# Patient Record
Sex: MALE | Race: AMERICAN INDIAN OR ALASKA NATIVE | Employment: FULL TIME | URBAN - METROPOLITAN AREA
[De-identification: names, ages, dates, MRNs, and addresses within clinical notes are randomized per-mention and may not be internally consistent; named-entity substitution may affect disease eponyms.]

---

## 2017-02-23 ENCOUNTER — OFFICE VISIT (OUTPATIENT)
Dept: INTERNAL MEDICINE CLINIC | Facility: CLINIC | Age: 57
End: 2017-02-23

## 2017-02-23 VITALS
BODY MASS INDEX: 26.5 KG/M2 | OXYGEN SATURATION: 97 % | HEIGHT: 62 IN | DIASTOLIC BLOOD PRESSURE: 90 MMHG | HEART RATE: 79 BPM | WEIGHT: 144 LBS | SYSTOLIC BLOOD PRESSURE: 128 MMHG | TEMPERATURE: 98 F

## 2017-02-23 DIAGNOSIS — J11.1 FLU SYNDROME: Primary | ICD-10-CM

## 2017-02-23 LAB
FLUAV + FLUBV RNA SPEC NAA+PROBE: NEGATIVE
FLUAV + FLUBV RNA SPEC NAA+PROBE: NEGATIVE
FLUAV + FLUBV RNA SPEC NAA+PROBE: POSITIVE

## 2017-02-23 PROCEDURE — 99213 OFFICE O/P EST LOW 20 MIN: CPT | Performed by: FAMILY MEDICINE

## 2017-02-23 PROCEDURE — 87631 RESP VIRUS 3-5 TARGETS: CPT | Performed by: FAMILY MEDICINE

## 2017-02-23 RX ORDER — OSELTAMIVIR PHOSPHATE 75 MG/1
75 CAPSULE ORAL 2 TIMES DAILY
Qty: 10 CAPSULE | Refills: 0 | Status: SHIPPED | OUTPATIENT
Start: 2017-02-23 | End: 2017-07-27 | Stop reason: ALTCHOICE

## 2017-02-23 RX ORDER — PROMETHAZINE HYDROCHLORIDE AND CODEINE PHOSPHATE 6.25; 1 MG/5ML; MG/5ML
5 SYRUP ORAL EVERY 6 HOURS PRN
Qty: 240 ML | Refills: 0 | Status: SHIPPED | OUTPATIENT
Start: 2017-02-23 | End: 2017-07-27 | Stop reason: ALTCHOICE

## 2017-02-24 NOTE — PROGRESS NOTES
Patient presents with:  Sore Throat: Pt presents to clinic for c/o sore throat and headaches since yesterday. Pt also had fever. HPI:   Андрей Garcia is a 64year old male who presents for upper respiratory symptoms for  2  days.  Patient reports low syndrome    - Respiratory Panel FLU A + B, RSV [E]; Future  - Oseltamivir Phosphate (TAMIFLU) 75 MG Oral Cap; Take 1 capsule (75 mg total) by mouth 2 (two) times daily. Dispense: 10 capsule; Refill: 0  - promethazine-codeine 6.25-10 MG/5ML Oral Syrup;  Raenette Bitters

## 2017-07-27 ENCOUNTER — OFFICE VISIT (OUTPATIENT)
Dept: INTERNAL MEDICINE CLINIC | Facility: CLINIC | Age: 57
End: 2017-07-27

## 2017-07-27 VITALS
HEART RATE: 67 BPM | WEIGHT: 144 LBS | DIASTOLIC BLOOD PRESSURE: 78 MMHG | SYSTOLIC BLOOD PRESSURE: 118 MMHG | OXYGEN SATURATION: 97 % | BODY MASS INDEX: 26.5 KG/M2 | RESPIRATION RATE: 17 BRPM | HEIGHT: 62 IN

## 2017-07-27 DIAGNOSIS — E78.1 HYPERTRIGLYCERIDEMIA: ICD-10-CM

## 2017-07-27 DIAGNOSIS — Z12.5 SCREENING FOR PROSTATE CANCER: ICD-10-CM

## 2017-07-27 DIAGNOSIS — Z00.00 ANNUAL PHYSICAL EXAM: Primary | ICD-10-CM

## 2017-07-27 DIAGNOSIS — E88.81 INSULIN RESISTANCE: ICD-10-CM

## 2017-07-27 LAB
CHOLEST SERPL-MCNC: 178 MG/DL (ref 110–200)
GLUCOSE SERPL-MCNC: 134 MG/DL (ref 70–99)
HDLC SERPL-MCNC: 37 MG/DL
LDLC SERPL CALC-MCNC: 83 MG/DL (ref 0–99)
NONHDLC SERPL-MCNC: 141 MG/DL
PSA SERPL-MCNC: 0.6 NG/ML (ref 0–4)
TRIGL SERPL-MCNC: 289 MG/DL (ref 1–149)

## 2017-07-27 PROCEDURE — 82947 ASSAY GLUCOSE BLOOD QUANT: CPT | Performed by: FAMILY MEDICINE

## 2017-07-27 PROCEDURE — 36415 COLL VENOUS BLD VENIPUNCTURE: CPT | Performed by: FAMILY MEDICINE

## 2017-07-27 PROCEDURE — 99396 PREV VISIT EST AGE 40-64: CPT | Performed by: FAMILY MEDICINE

## 2017-07-27 PROCEDURE — 80061 LIPID PANEL: CPT | Performed by: FAMILY MEDICINE

## 2017-07-27 PROCEDURE — 84153 ASSAY OF PSA TOTAL: CPT | Performed by: FAMILY MEDICINE

## 2017-07-27 NOTE — PROGRESS NOTES
Allan Murdock is a 62year old male who presents for a complete physical exam.   HPI:     Concerns:  STOPPED METFORMIN ER & HAS ELIMINATED SODA'S/JUICE TO DECREASE GLUCOSE.  TAKES ONE FISH OIL CAP DAILY    Last colonoscopy:  2011  [address] headaches  PSYCHE: denies depression or anxiety    EXAM:   /78 (BP Location: Right arm, Patient Position: Sitting, Cuff Size: adult)   Pulse 67   Resp 17   Ht 62\"   Wt 144 lb   SpO2 97%   BMI 26.34 kg/m²   Body mass index is 26.34 kg/m².    GENERAL:

## 2017-11-10 DIAGNOSIS — M1A.0620 IDIOPATHIC CHRONIC GOUT OF LEFT KNEE WITHOUT TOPHUS: Primary | ICD-10-CM

## 2017-11-10 RX ORDER — INDOMETHACIN 50 MG/1
CAPSULE ORAL
Qty: 50 CAPSULE | Refills: 0 | Status: SHIPPED | OUTPATIENT
Start: 2017-11-10 | End: 2019-01-14

## 2018-09-11 ENCOUNTER — OFFICE VISIT (OUTPATIENT)
Dept: INTERNAL MEDICINE CLINIC | Facility: CLINIC | Age: 58
End: 2018-09-11
Payer: COMMERCIAL

## 2018-09-11 VITALS
BODY MASS INDEX: 26.13 KG/M2 | OXYGEN SATURATION: 97 % | HEART RATE: 62 BPM | HEIGHT: 62 IN | WEIGHT: 142 LBS | DIASTOLIC BLOOD PRESSURE: 70 MMHG | SYSTOLIC BLOOD PRESSURE: 128 MMHG

## 2018-09-11 DIAGNOSIS — Z00.00 ANNUAL PHYSICAL EXAM: Primary | ICD-10-CM

## 2018-09-11 DIAGNOSIS — Z12.5 SCREENING FOR PROSTATE CANCER: ICD-10-CM

## 2018-09-11 DIAGNOSIS — Z87.39 PERSONAL HISTORY OF GOUT: ICD-10-CM

## 2018-09-11 DIAGNOSIS — E88.81 INSULIN RESISTANCE: ICD-10-CM

## 2018-09-11 DIAGNOSIS — E78.1 HYPERTRIGLYCERIDEMIA: ICD-10-CM

## 2018-09-11 LAB
ALBUMIN SERPL BCP-MCNC: 4.2 G/DL (ref 3.5–4.8)
ALBUMIN/GLOB SERPL: 1.5 {RATIO} (ref 1–2)
ALP SERPL-CCNC: 61 U/L (ref 32–100)
ALT SERPL-CCNC: 38 U/L (ref 17–63)
ANION GAP SERPL CALC-SCNC: 9 MMOL/L (ref 0–18)
AST SERPL-CCNC: 27 U/L (ref 15–41)
BILIRUB SERPL-MCNC: 1.1 MG/DL (ref 0.3–1.2)
BUN SERPL-MCNC: 12 MG/DL (ref 8–20)
BUN/CREAT SERPL: 15.4 (ref 10–20)
CALCIUM SERPL-MCNC: 9.1 MG/DL (ref 8.5–10.5)
CHLORIDE SERPL-SCNC: 105 MMOL/L (ref 95–110)
CHOLEST SERPL-MCNC: 185 MG/DL (ref 110–200)
CO2 SERPL-SCNC: 23 MMOL/L (ref 22–32)
CREAT SERPL-MCNC: 0.78 MG/DL (ref 0.5–1.5)
GLOBULIN PLAS-MCNC: 2.8 G/DL (ref 2.5–3.7)
GLUCOSE SERPL-MCNC: 144 MG/DL (ref 70–99)
HDLC SERPL-MCNC: 40 MG/DL
LDLC SERPL CALC-MCNC: 92 MG/DL (ref 0–99)
NONHDLC SERPL-MCNC: 145 MG/DL
OSMOLALITY UR CALC.SUM OF ELEC: 286 MOSM/KG (ref 275–295)
PATIENT FASTING: YES
POTASSIUM SERPL-SCNC: 3.7 MMOL/L (ref 3.3–5.1)
PROT SERPL-MCNC: 7 G/DL (ref 5.9–8.4)
PSA SERPL-MCNC: 0.8 NG/ML (ref 0–4)
SODIUM SERPL-SCNC: 137 MMOL/L (ref 136–144)
TRIGL SERPL-MCNC: 264 MG/DL (ref 1–149)

## 2018-09-11 PROCEDURE — 84153 ASSAY OF PSA TOTAL: CPT | Performed by: FAMILY MEDICINE

## 2018-09-11 PROCEDURE — 83036 HEMOGLOBIN GLYCOSYLATED A1C: CPT | Performed by: FAMILY MEDICINE

## 2018-09-11 PROCEDURE — 80053 COMPREHEN METABOLIC PANEL: CPT | Performed by: FAMILY MEDICINE

## 2018-09-11 PROCEDURE — 99396 PREV VISIT EST AGE 40-64: CPT | Performed by: FAMILY MEDICINE

## 2018-09-11 PROCEDURE — 80061 LIPID PANEL: CPT | Performed by: FAMILY MEDICINE

## 2018-09-11 RX ORDER — LOSARTAN POTASSIUM 50 MG/1
50 TABLET ORAL DAILY
Qty: 90 TABLET | Refills: 3 | Status: SHIPPED | OUTPATIENT
Start: 2018-09-11 | End: 2019-02-12

## 2018-09-11 RX ORDER — METFORMIN HYDROCHLORIDE 500 MG/1
500 TABLET, EXTENDED RELEASE ORAL DAILY
Qty: 90 TABLET | Refills: 3 | Status: SHIPPED | OUTPATIENT
Start: 2018-09-11 | End: 2019-02-12

## 2018-09-11 RX ORDER — INDOMETHACIN 50 MG/1
50 CAPSULE ORAL 2 TIMES DAILY PRN
Qty: 30 CAPSULE | Refills: 0 | Status: SHIPPED | OUTPATIENT
Start: 2018-09-11 | End: 2020-04-18 | Stop reason: ALTCHOICE

## 2018-09-11 NOTE — PROGRESS NOTES
Serena Gutierrez is a 62year old male who presents for a complete physical exam/FASTING FOR LABS. HPI:     Concerns:  CURRENTLY NOT TAKING ANY MEDS. GETS GOUT FLARE UP ON KNEES A COUPLE TIMES A YEAR WHICH RESPONDS TO INDOCIN.     Last colonoscopy:  8 YEARS denies chest pain on exertion  GI: denies abdominal pain, constipation or diarrhea  : denies nocturia or changes in stream  NEURO: denies headaches  PSYCHE: denies depression or anxiety  MSKL:  EPISODIC KNEE PAINS/GOUT A COUPLE TIMES A YEAR AS PER CC

## 2018-09-12 LAB — HBA1C MFR BLD: 7.3 % (ref 4–6)

## 2018-12-21 DIAGNOSIS — E11.9 TYPE 2 DIABETES MELLITUS WITHOUT COMPLICATION, WITHOUT LONG-TERM CURRENT USE OF INSULIN (HCC): Primary | ICD-10-CM

## 2018-12-21 RX ORDER — ATORVASTATIN CALCIUM 10 MG/1
10 TABLET, FILM COATED ORAL NIGHTLY
Qty: 90 TABLET | Refills: 3 | Status: SHIPPED | OUTPATIENT
Start: 2018-12-21 | End: 2019-02-12

## 2019-01-14 DIAGNOSIS — M1A.0620 IDIOPATHIC CHRONIC GOUT OF LEFT KNEE WITHOUT TOPHUS: ICD-10-CM

## 2019-01-14 RX ORDER — INDOMETHACIN 50 MG/1
CAPSULE ORAL
Qty: 50 CAPSULE | Refills: 0 | Status: SHIPPED | OUTPATIENT
Start: 2019-01-14 | End: 2019-09-04

## 2019-02-12 DIAGNOSIS — E11.9 TYPE 2 DIABETES MELLITUS WITHOUT COMPLICATION, WITHOUT LONG-TERM CURRENT USE OF INSULIN (HCC): ICD-10-CM

## 2019-02-12 RX ORDER — METFORMIN HYDROCHLORIDE 500 MG/1
500 TABLET, EXTENDED RELEASE ORAL DAILY
Qty: 90 TABLET | Refills: 1 | Status: SHIPPED | OUTPATIENT
Start: 2019-02-12 | End: 2019-04-12 | Stop reason: DRUGHIGH

## 2019-02-12 RX ORDER — LOSARTAN POTASSIUM 50 MG/1
50 TABLET ORAL DAILY
Qty: 90 TABLET | Refills: 1 | Status: SHIPPED | OUTPATIENT
Start: 2019-02-12 | End: 2019-04-12

## 2019-02-12 RX ORDER — ATORVASTATIN CALCIUM 10 MG/1
10 TABLET, FILM COATED ORAL NIGHTLY
Qty: 90 TABLET | Refills: 1 | Status: SHIPPED | OUTPATIENT
Start: 2019-02-12 | End: 2019-04-12

## 2019-04-12 ENCOUNTER — OFFICE VISIT (OUTPATIENT)
Dept: INTERNAL MEDICINE CLINIC | Facility: CLINIC | Age: 59
End: 2019-04-12
Payer: COMMERCIAL

## 2019-04-12 VITALS
DIASTOLIC BLOOD PRESSURE: 72 MMHG | SYSTOLIC BLOOD PRESSURE: 132 MMHG | BODY MASS INDEX: 26.39 KG/M2 | TEMPERATURE: 99 F | OXYGEN SATURATION: 98 % | HEART RATE: 68 BPM | RESPIRATION RATE: 17 BRPM | HEIGHT: 62 IN | WEIGHT: 143.38 LBS

## 2019-04-12 DIAGNOSIS — E11.9 TYPE 2 DIABETES MELLITUS WITHOUT COMPLICATION, WITHOUT LONG-TERM CURRENT USE OF INSULIN (HCC): Primary | ICD-10-CM

## 2019-04-12 DIAGNOSIS — E78.1 HYPERTRIGLYCERIDEMIA: ICD-10-CM

## 2019-04-12 PROCEDURE — 80061 LIPID PANEL: CPT | Performed by: FAMILY MEDICINE

## 2019-04-12 PROCEDURE — 83036 HEMOGLOBIN GLYCOSYLATED A1C: CPT | Performed by: FAMILY MEDICINE

## 2019-04-12 PROCEDURE — 99213 OFFICE O/P EST LOW 20 MIN: CPT | Performed by: FAMILY MEDICINE

## 2019-04-12 PROCEDURE — 80053 COMPREHEN METABOLIC PANEL: CPT | Performed by: FAMILY MEDICINE

## 2019-04-12 RX ORDER — ATORVASTATIN CALCIUM 10 MG/1
10 TABLET, FILM COATED ORAL NIGHTLY
Qty: 90 TABLET | Refills: 1 | Status: SHIPPED | OUTPATIENT
Start: 2019-04-12 | End: 2020-04-18

## 2019-04-12 RX ORDER — LOSARTAN POTASSIUM 50 MG/1
50 TABLET ORAL DAILY
Qty: 90 TABLET | Refills: 1 | Status: SHIPPED | OUTPATIENT
Start: 2019-04-12 | End: 2020-04-18

## 2019-04-12 RX ORDER — METFORMIN HYDROCHLORIDE 750 MG/1
750 TABLET, EXTENDED RELEASE ORAL DAILY
Qty: 90 TABLET | Refills: 1 | Status: SHIPPED | OUTPATIENT
Start: 2019-04-12 | End: 2019-06-07

## 2019-04-12 NOTE — PROGRESS NOTES
CC:  Diabetes (patient f/u on DM. fasting today )      Hx of CC:  FASTING FOR RECHECK OF DM AND TRIGLYCERIDES. ON METFORMIN  MG AND TAKING FISH OIL CAPS. DOES NOT CHECK GLUCOSE AT HOME. NO COMPLAINTS.     Vitals:    04/12/19  0915   BP: 132/72   Pu

## 2019-06-07 DIAGNOSIS — E11.9 TYPE 2 DIABETES MELLITUS WITHOUT COMPLICATION, WITHOUT LONG-TERM CURRENT USE OF INSULIN (HCC): ICD-10-CM

## 2019-06-07 RX ORDER — METFORMIN HYDROCHLORIDE 750 MG/1
750 TABLET, EXTENDED RELEASE ORAL 2 TIMES DAILY WITH MEALS
Qty: 180 TABLET | Refills: 1 | Status: SHIPPED | OUTPATIENT
Start: 2019-06-07 | End: 2020-04-18

## 2019-09-04 DIAGNOSIS — M1A.0620 IDIOPATHIC CHRONIC GOUT OF LEFT KNEE WITHOUT TOPHUS: ICD-10-CM

## 2019-09-04 NOTE — TELEPHONE ENCOUNTER
Requested Prescriptions     Pending Prescriptions Disp Refills   • INDOMETHACIN 50 MG Oral Cap 50 capsule 0     Sig: TAKE ONE CAPSULE BY MOUTH TWICE DAILY AS NEEDED FOR PAIN     Last office visit: 4-12-19  Medication last refilled: 1-14-19 # 50 x 0

## 2019-09-05 RX ORDER — INDOMETHACIN 50 MG/1
CAPSULE ORAL
Qty: 50 CAPSULE | Refills: 0 | Status: SHIPPED | OUTPATIENT
Start: 2019-09-05 | End: 2020-04-18 | Stop reason: ALTCHOICE

## 2020-04-18 DIAGNOSIS — E11.9 TYPE 2 DIABETES MELLITUS WITHOUT COMPLICATION, WITHOUT LONG-TERM CURRENT USE OF INSULIN (HCC): ICD-10-CM

## 2020-04-18 RX ORDER — ATORVASTATIN CALCIUM 10 MG/1
10 TABLET, FILM COATED ORAL NIGHTLY
Qty: 90 TABLET | Refills: 1 | Status: SHIPPED | OUTPATIENT
Start: 2020-04-18 | End: 2020-08-28

## 2020-04-18 RX ORDER — METFORMIN HYDROCHLORIDE 750 MG/1
750 TABLET, EXTENDED RELEASE ORAL 2 TIMES DAILY WITH MEALS
Qty: 180 TABLET | Refills: 1 | Status: SHIPPED | OUTPATIENT
Start: 2020-04-18 | End: 2020-08-28 | Stop reason: DRUGHIGH

## 2020-04-18 RX ORDER — LOSARTAN POTASSIUM 50 MG/1
50 TABLET ORAL DAILY
Qty: 90 TABLET | Refills: 1 | Status: SHIPPED | OUTPATIENT
Start: 2020-04-18 | End: 2020-08-28 | Stop reason: DRUGHIGH

## 2020-08-28 ENCOUNTER — OFFICE VISIT (OUTPATIENT)
Dept: INTERNAL MEDICINE CLINIC | Facility: CLINIC | Age: 60
End: 2020-08-28
Payer: COMMERCIAL

## 2020-08-28 VITALS
HEART RATE: 71 BPM | RESPIRATION RATE: 17 BRPM | DIASTOLIC BLOOD PRESSURE: 90 MMHG | WEIGHT: 137 LBS | HEIGHT: 62 IN | BODY MASS INDEX: 25.21 KG/M2 | OXYGEN SATURATION: 98 % | SYSTOLIC BLOOD PRESSURE: 154 MMHG

## 2020-08-28 DIAGNOSIS — Z23 NEED FOR VACCINATION FOR STREP PNEUMONIAE: ICD-10-CM

## 2020-08-28 DIAGNOSIS — Z87.39 PERSONAL HISTORY OF GOUT: ICD-10-CM

## 2020-08-28 DIAGNOSIS — Z00.00 ANNUAL PHYSICAL EXAM: Primary | ICD-10-CM

## 2020-08-28 DIAGNOSIS — I10 ESSENTIAL HYPERTENSION: ICD-10-CM

## 2020-08-28 DIAGNOSIS — E11.9 TYPE 2 DIABETES MELLITUS WITHOUT COMPLICATION, WITHOUT LONG-TERM CURRENT USE OF INSULIN (HCC): ICD-10-CM

## 2020-08-28 DIAGNOSIS — E78.1 HYPERTRIGLYCERIDEMIA: ICD-10-CM

## 2020-08-28 LAB
ALBUMIN SERPL-MCNC: 3.7 G/DL (ref 3.4–5)
ALBUMIN/GLOB SERPL: 1 {RATIO} (ref 1–2)
ALP LIVER SERPL-CCNC: 71 U/L (ref 45–117)
ALT SERPL-CCNC: 31 U/L (ref 16–61)
ANION GAP SERPL CALC-SCNC: 9 MMOL/L (ref 0–18)
AST SERPL-CCNC: 11 U/L (ref 15–37)
BILIRUB SERPL-MCNC: 0.8 MG/DL (ref 0.1–2)
BUN BLD-MCNC: 17 MG/DL (ref 7–18)
BUN/CREAT SERPL: 16.2 (ref 10–20)
CALCIUM BLD-MCNC: 9.1 MG/DL (ref 8.5–10.1)
CHLORIDE SERPL-SCNC: 108 MMOL/L (ref 98–112)
CHOLEST SMN-MCNC: 176 MG/DL (ref ?–200)
CO2 SERPL-SCNC: 24 MMOL/L (ref 21–32)
CREAT BLD-MCNC: 1.05 MG/DL (ref 0.7–1.3)
EST. AVERAGE GLUCOSE BLD GHB EST-MCNC: 160 MG/DL (ref 68–126)
GLOBULIN PLAS-MCNC: 3.6 G/DL (ref 2.8–4.4)
GLUCOSE BLD-MCNC: 129 MG/DL (ref 70–99)
HBA1C MFR BLD HPLC: 7.2 % (ref ?–5.7)
HDLC SERPL-MCNC: 44 MG/DL (ref 40–59)
LDLC SERPL CALC-MCNC: 95 MG/DL (ref ?–100)
M PROTEIN MFR SERPL ELPH: 7.3 G/DL (ref 6.4–8.2)
NONHDLC SERPL-MCNC: 132 MG/DL (ref ?–130)
OSMOLALITY SERPL CALC.SUM OF ELEC: 295 MOSM/KG (ref 275–295)
PATIENT FASTING Y/N/NP: YES
PATIENT FASTING Y/N/NP: YES
POTASSIUM SERPL-SCNC: 3.4 MMOL/L (ref 3.5–5.1)
SODIUM SERPL-SCNC: 141 MMOL/L (ref 136–145)
TRIGL SERPL-MCNC: 185 MG/DL (ref 30–149)
URATE SERPL-MCNC: 6 MG/DL (ref 3.5–7.2)
VLDLC SERPL CALC-MCNC: 37 MG/DL (ref 0–30)

## 2020-08-28 PROCEDURE — 80053 COMPREHEN METABOLIC PANEL: CPT | Performed by: FAMILY MEDICINE

## 2020-08-28 PROCEDURE — 80061 LIPID PANEL: CPT | Performed by: FAMILY MEDICINE

## 2020-08-28 PROCEDURE — 90471 IMMUNIZATION ADMIN: CPT | Performed by: FAMILY MEDICINE

## 2020-08-28 PROCEDURE — 3077F SYST BP >= 140 MM HG: CPT | Performed by: FAMILY MEDICINE

## 2020-08-28 PROCEDURE — 3080F DIAST BP >= 90 MM HG: CPT | Performed by: FAMILY MEDICINE

## 2020-08-28 PROCEDURE — 3008F BODY MASS INDEX DOCD: CPT | Performed by: FAMILY MEDICINE

## 2020-08-28 PROCEDURE — 84550 ASSAY OF BLOOD/URIC ACID: CPT | Performed by: FAMILY MEDICINE

## 2020-08-28 PROCEDURE — 99396 PREV VISIT EST AGE 40-64: CPT | Performed by: FAMILY MEDICINE

## 2020-08-28 PROCEDURE — 83036 HEMOGLOBIN GLYCOSYLATED A1C: CPT | Performed by: FAMILY MEDICINE

## 2020-08-28 PROCEDURE — 90732 PPSV23 VACC 2 YRS+ SUBQ/IM: CPT | Performed by: FAMILY MEDICINE

## 2020-08-28 RX ORDER — ATORVASTATIN CALCIUM 10 MG/1
10 TABLET, FILM COATED ORAL NIGHTLY
Qty: 90 TABLET | Refills: 3 | Status: SHIPPED | OUTPATIENT
Start: 2020-08-28 | End: 2021-07-27

## 2020-08-28 RX ORDER — LOSARTAN POTASSIUM 100 MG/1
100 TABLET ORAL DAILY
Qty: 90 TABLET | Refills: 3 | Status: SHIPPED | OUTPATIENT
Start: 2020-08-28 | End: 2021-07-27

## 2020-08-28 NOTE — PROGRESS NOTES
Barby Mcgrath is a 26[address] [facility] who presents for a complete physical exam.   HPI:     Concerns:  Glucose levels in the 150-170 F range on metformin er 750 mg    Last colonoscopy:  2011  Last PSA: 2019    Wt Readings from Last 6 Encounters:  08/28/20 : denies abdominal pain, constipation or diarrhea  : denies nocturia or changes in stream  NEURO: denies headaches  PSYCHE: denies depression or anxiety  MSKL:  HAD RECENT LEFT FOOT PAIN/SWELLING--? RECURRENT GOUT FLARE    EXAM:   /90   Pulse 71   Re Refills for this Visit:  Requested Prescriptions     Signed Prescriptions Disp Refills   • SITagliptin-metFORMIN HCl (JANUMET)  MG Oral Tab 180 tablet 3     Sig: Take 1 tablet by mouth 2 (two) times daily with meals.    • losartan 100 MG Oral Tab 90

## 2020-10-14 ENCOUNTER — TELEPHONE (OUTPATIENT)
Dept: INTERNAL MEDICINE CLINIC | Facility: CLINIC | Age: 60
End: 2020-10-14

## 2020-10-14 NOTE — TELEPHONE ENCOUNTER
Unable to leave message for patient, second call to scheduled him with Dr. Tasha Davis on  10/19/2020.

## 2020-10-23 RX ORDER — INDOMETHACIN 50 MG/1
50 CAPSULE ORAL 2 TIMES DAILY PRN
Qty: 60 CAPSULE | Refills: 0 | Status: SHIPPED | OUTPATIENT
Start: 2020-10-23 | End: 2021-07-27

## 2020-10-29 RX ORDER — INDOMETHACIN 50 MG/1
CAPSULE ORAL
Qty: 60 CAPSULE | Refills: 11 | OUTPATIENT
Start: 2020-10-29

## 2020-12-18 ENCOUNTER — IMMUNIZATION (OUTPATIENT)
Dept: INTERNAL MEDICINE CLINIC | Facility: CLINIC | Age: 60
End: 2020-12-18
Payer: COMMERCIAL

## 2020-12-18 DIAGNOSIS — Z23 NEED FOR VACCINATION: ICD-10-CM

## 2020-12-18 PROCEDURE — 90471 IMMUNIZATION ADMIN: CPT | Performed by: FAMILY MEDICINE

## 2020-12-18 PROCEDURE — 90686 IIV4 VACC NO PRSV 0.5 ML IM: CPT | Performed by: FAMILY MEDICINE

## 2021-07-27 ENCOUNTER — OFFICE VISIT (OUTPATIENT)
Dept: INTERNAL MEDICINE CLINIC | Facility: CLINIC | Age: 61
End: 2021-07-27
Payer: COMMERCIAL

## 2021-07-27 VITALS
WEIGHT: 138 LBS | RESPIRATION RATE: 16 BRPM | BODY MASS INDEX: 25.4 KG/M2 | OXYGEN SATURATION: 97 % | HEART RATE: 75 BPM | DIASTOLIC BLOOD PRESSURE: 84 MMHG | HEIGHT: 62 IN | SYSTOLIC BLOOD PRESSURE: 120 MMHG

## 2021-07-27 DIAGNOSIS — M54.50 ACUTE RIGHT-SIDED LOW BACK PAIN WITHOUT SCIATICA: ICD-10-CM

## 2021-07-27 DIAGNOSIS — E78.1 HYPERTRIGLYCERIDEMIA: ICD-10-CM

## 2021-07-27 DIAGNOSIS — Z12.11 COLON CANCER SCREENING: ICD-10-CM

## 2021-07-27 DIAGNOSIS — E11.9 TYPE 2 DIABETES MELLITUS WITHOUT COMPLICATION, WITHOUT LONG-TERM CURRENT USE OF INSULIN (HCC): Primary | ICD-10-CM

## 2021-07-27 DIAGNOSIS — Z12.5 PROSTATE CANCER SCREENING: ICD-10-CM

## 2021-07-27 DIAGNOSIS — I10 ESSENTIAL HYPERTENSION: ICD-10-CM

## 2021-07-27 LAB
ALBUMIN SERPL-MCNC: 3.7 G/DL (ref 3.4–5)
ALBUMIN/GLOB SERPL: 1.1 {RATIO} (ref 1–2)
ALP LIVER SERPL-CCNC: 62 U/L
ALT SERPL-CCNC: 38 U/L
ANION GAP SERPL CALC-SCNC: 8 MMOL/L (ref 0–18)
AST SERPL-CCNC: 15 U/L (ref 15–37)
BILIRUB SERPL-MCNC: 1 MG/DL (ref 0.1–2)
BUN BLD-MCNC: 18 MG/DL (ref 7–18)
BUN/CREAT SERPL: 20.9 (ref 10–20)
CALCIUM BLD-MCNC: 8.9 MG/DL (ref 8.5–10.1)
CARTRIDGE LOT#: 814 NUMERIC
CHLORIDE SERPL-SCNC: 108 MMOL/L (ref 98–112)
CHOLEST SMN-MCNC: 165 MG/DL (ref ?–200)
CO2 SERPL-SCNC: 23 MMOL/L (ref 21–32)
COMPLEXED PSA SERPL-MCNC: 1.04 NG/ML (ref ?–4)
CREAT BLD-MCNC: 0.86 MG/DL
CREAT UR-SCNC: 170 MG/DL
GLOBULIN PLAS-MCNC: 3.4 G/DL (ref 2.8–4.4)
GLUCOSE BLD-MCNC: 135 MG/DL (ref 70–99)
HDLC SERPL-MCNC: 35 MG/DL (ref 40–59)
HEMOGLOBIN A1C: 6.3 % (ref 4.3–5.6)
LDLC SERPL CALC-MCNC: 84 MG/DL (ref ?–100)
M PROTEIN MFR SERPL ELPH: 7.1 G/DL (ref 6.4–8.2)
MICROALBUMIN UR-MCNC: 1.43 MG/DL
MICROALBUMIN/CREAT 24H UR-RTO: 8.4 UG/MG (ref ?–30)
NONHDLC SERPL-MCNC: 130 MG/DL (ref ?–130)
OSMOLALITY SERPL CALC.SUM OF ELEC: 292 MOSM/KG (ref 275–295)
PATIENT FASTING Y/N/NP: YES
PATIENT FASTING Y/N/NP: YES
POTASSIUM SERPL-SCNC: 3.9 MMOL/L (ref 3.5–5.1)
SODIUM SERPL-SCNC: 139 MMOL/L (ref 136–145)
TRIGL SERPL-MCNC: 281 MG/DL (ref 30–149)
VLDLC SERPL CALC-MCNC: 45 MG/DL (ref 0–30)

## 2021-07-27 PROCEDURE — 82043 UR ALBUMIN QUANTITATIVE: CPT | Performed by: FAMILY MEDICINE

## 2021-07-27 PROCEDURE — 3074F SYST BP LT 130 MM HG: CPT | Performed by: FAMILY MEDICINE

## 2021-07-27 PROCEDURE — 3061F NEG MICROALBUMINURIA REV: CPT | Performed by: FAMILY MEDICINE

## 2021-07-27 PROCEDURE — 3079F DIAST BP 80-89 MM HG: CPT | Performed by: FAMILY MEDICINE

## 2021-07-27 PROCEDURE — 82570 ASSAY OF URINE CREATININE: CPT | Performed by: FAMILY MEDICINE

## 2021-07-27 PROCEDURE — 3044F HG A1C LEVEL LT 7.0%: CPT | Performed by: FAMILY MEDICINE

## 2021-07-27 PROCEDURE — 84153 ASSAY OF PSA TOTAL: CPT | Performed by: FAMILY MEDICINE

## 2021-07-27 PROCEDURE — 83036 HEMOGLOBIN GLYCOSYLATED A1C: CPT | Performed by: FAMILY MEDICINE

## 2021-07-27 PROCEDURE — 80053 COMPREHEN METABOLIC PANEL: CPT | Performed by: FAMILY MEDICINE

## 2021-07-27 PROCEDURE — 99214 OFFICE O/P EST MOD 30 MIN: CPT | Performed by: FAMILY MEDICINE

## 2021-07-27 PROCEDURE — 80061 LIPID PANEL: CPT | Performed by: FAMILY MEDICINE

## 2021-07-27 PROCEDURE — 3008F BODY MASS INDEX DOCD: CPT | Performed by: FAMILY MEDICINE

## 2021-07-27 RX ORDER — INDOMETHACIN 50 MG/1
50 CAPSULE ORAL 2 TIMES DAILY PRN
Qty: 30 CAPSULE | Refills: 0 | Status: SHIPPED | OUTPATIENT
Start: 2021-07-27 | End: 2021-10-05 | Stop reason: ALTCHOICE

## 2021-07-27 RX ORDER — SITAGLIPTIN AND METFORMIN HYDROCHLORIDE 50; 1000 MG/1; MG/1
1 TABLET, FILM COATED ORAL DAILY
Qty: 90 TABLET | Refills: 1 | Status: SHIPPED | OUTPATIENT
Start: 2021-07-27 | End: 2022-01-26

## 2021-07-27 RX ORDER — ATORVASTATIN CALCIUM 10 MG/1
10 TABLET, FILM COATED ORAL NIGHTLY
Qty: 90 TABLET | Refills: 3 | Status: SHIPPED | OUTPATIENT
Start: 2021-07-27

## 2021-07-27 RX ORDER — LOSARTAN POTASSIUM 100 MG/1
100 TABLET ORAL DAILY
Qty: 90 TABLET | Refills: 1 | Status: SHIPPED | OUTPATIENT
Start: 2021-07-27

## 2021-07-27 NOTE — PROGRESS NOTES
PATIENT IDENTIFICATION  Name: Liliana Price  MRN: [id number]    Diagnoses:   Type 2 diabetes mellitus without complication, without long-term current use of insulin ([facility]  (primary encounter diagnosis)  Colon cancer screening  Prostate cancer screening  H adult)   Pulse 75   Resp 16   Ht 5' 2\" (1.575 m)   Wt 138 lb (62.6 kg)   SpO2 97%   BMI 25.24 kg/m²   General Appearance: in no apparent distress and well developed and well nourished  HEENT: Normocephalic, atraumatic  Lungs: normal respiratory effort.

## 2021-08-03 ENCOUNTER — TELEPHONE (OUTPATIENT)
Dept: PHYSICAL THERAPY | Facility: HOSPITAL | Age: 61
End: 2021-08-03

## 2021-08-04 ENCOUNTER — OFFICE VISIT (OUTPATIENT)
Dept: PHYSICAL THERAPY | Age: 61
End: 2021-08-04
Attending: FAMILY MEDICINE
Payer: COMMERCIAL

## 2021-08-04 DIAGNOSIS — M54.50 ACUTE RIGHT-SIDED LOW BACK PAIN WITHOUT SCIATICA: ICD-10-CM

## 2021-08-04 PROCEDURE — 97110 THERAPEUTIC EXERCISES: CPT

## 2021-08-04 PROCEDURE — 97162 PT EVAL MOD COMPLEX 30 MIN: CPT

## 2021-08-04 PROCEDURE — 97140 MANUAL THERAPY 1/> REGIONS: CPT

## 2021-08-04 NOTE — PHYSICAL THERAPY NOTE
SPINE EVALUATION:   Referring Physician: Dr. Savita Navarro  Diagnosis: low back pain    Date of Service: 8/4/2021     PATIENT SUMMARY   Kanchan Quintana is a 64year old male who presents to therapy today with his daughter as he does not speak [place].  Pt and his daughter, Pt voiced understanding and performs HEP correctly without reported pain. Skilled Physical Therapy is medically necessary to address the above impairments and reach functional goals.      Precautions:  None  OBJECTIVE:   Vitals: 124/73mmHg  Observ - 3 sets - 30 hold  Seated Thoracic Lumbar Extension - 3 x daily - 7 x weekly - 1 sets - 10 reps    Charges: PT Eval Moderate Complexity, 1 unit therapeutic exercise, 1 unit of manual therapy      Total Timed Treatment: 17 min     Total Treatment Time: 55 Date____________________    Certification From: 2/9/2449  To:11/2/2021

## 2021-08-09 ENCOUNTER — OFFICE VISIT (OUTPATIENT)
Dept: PHYSICAL THERAPY | Age: 61
End: 2021-08-09
Attending: FAMILY MEDICINE
Payer: COMMERCIAL

## 2021-08-09 PROCEDURE — 97110 THERAPEUTIC EXERCISES: CPT

## 2021-08-09 PROCEDURE — 97140 MANUAL THERAPY 1/> REGIONS: CPT

## 2021-08-09 PROCEDURE — 97112 NEUROMUSCULAR REEDUCATION: CPT

## 2021-08-09 NOTE — PROGRESS NOTES
Dx: low back pain           Insurance (Authorized # of Visits):  Kelly SHIELDS, 8 visits before 11/2/21  Authorizing Physician: Dr. Jack Hernández  Next MD visit: none scheduled  Fall Risk: standard         Precautions: n/a         Date of Evaluation:  8/4/2021    Pedro as it does appear he over activates his B UTs with UE activities. Goals: in 8 visits  Pt will demonstrate good posture awareness during UE activities while on the job at work.   Pt will be independent with self-management strategies to reduce his back

## 2021-08-13 ENCOUNTER — OFFICE VISIT (OUTPATIENT)
Dept: PHYSICAL THERAPY | Age: 61
End: 2021-08-13
Attending: FAMILY MEDICINE
Payer: COMMERCIAL

## 2021-08-13 PROCEDURE — 97140 MANUAL THERAPY 1/> REGIONS: CPT

## 2021-08-13 PROCEDURE — 97110 THERAPEUTIC EXERCISES: CPT

## 2021-08-13 PROCEDURE — 97112 NEUROMUSCULAR REEDUCATION: CPT

## 2021-08-13 NOTE — PROGRESS NOTES
Dx: low back pain           Insurance (Authorized # of Visits):  Sonali SHIELDS, 8 visits before 11/2/21  Authorizing Physician: Dr. Jovany Prater  Next MD visit: none scheduled  Fall Risk: standard         Precautions: n/a         Date of Evaluation:  8/4/2021    Pedro activities while on the job at work. Pt will be independent with self-management strategies to reduce his back pain after having a flare up.   Pt will demonstrate improvements in thoracic and parascapular strength in order to perform work duties with safe

## 2021-08-18 ENCOUNTER — APPOINTMENT (OUTPATIENT)
Dept: PHYSICAL THERAPY | Age: 61
End: 2021-08-18
Attending: FAMILY MEDICINE
Payer: COMMERCIAL

## 2021-08-19 ENCOUNTER — APPOINTMENT (OUTPATIENT)
Dept: PHYSICAL THERAPY | Age: 61
End: 2021-08-19
Attending: FAMILY MEDICINE
Payer: COMMERCIAL

## 2021-08-23 ENCOUNTER — TELEPHONE (OUTPATIENT)
Dept: PHYSICAL THERAPY | Age: 61
End: 2021-08-23

## 2021-08-23 ENCOUNTER — APPOINTMENT (OUTPATIENT)
Dept: PHYSICAL THERAPY | Age: 61
End: 2021-08-23
Attending: FAMILY MEDICINE
Payer: COMMERCIAL

## 2021-08-23 ENCOUNTER — TELEPHONE (OUTPATIENT)
Dept: PHYSICAL THERAPY | Facility: HOSPITAL | Age: 61
End: 2021-08-23

## 2021-08-23 NOTE — TELEPHONE ENCOUNTER
Pt's daughter notified call center that pt has left the country for an extended vacation and will not be returning to complete the remainder of his physical therapy appointments.  At this time, pt will be discharged from physical therapy

## 2021-08-27 ENCOUNTER — APPOINTMENT (OUTPATIENT)
Dept: PHYSICAL THERAPY | Age: 61
End: 2021-08-27
Attending: FAMILY MEDICINE
Payer: COMMERCIAL

## 2021-10-05 ENCOUNTER — OFFICE VISIT (OUTPATIENT)
Dept: INTERNAL MEDICINE CLINIC | Facility: CLINIC | Age: 61
End: 2021-10-05
Payer: COMMERCIAL

## 2021-10-05 VITALS
HEART RATE: 65 BPM | HEIGHT: 62 IN | OXYGEN SATURATION: 97 % | RESPIRATION RATE: 15 BRPM | WEIGHT: 138 LBS | DIASTOLIC BLOOD PRESSURE: 90 MMHG | BODY MASS INDEX: 25.4 KG/M2 | SYSTOLIC BLOOD PRESSURE: 152 MMHG

## 2021-10-05 DIAGNOSIS — Z00.00 PREVENTATIVE HEALTH CARE: Primary | ICD-10-CM

## 2021-10-05 DIAGNOSIS — Z23 NEED FOR INFLUENZA VACCINATION: ICD-10-CM

## 2021-10-05 DIAGNOSIS — Z12.11 COLON CANCER SCREENING: ICD-10-CM

## 2021-10-05 DIAGNOSIS — I10 ESSENTIAL HYPERTENSION: ICD-10-CM

## 2021-10-05 DIAGNOSIS — E11.9 TYPE 2 DIABETES MELLITUS WITHOUT COMPLICATION, WITHOUT LONG-TERM CURRENT USE OF INSULIN (HCC): ICD-10-CM

## 2021-10-05 PROCEDURE — 3008F BODY MASS INDEX DOCD: CPT | Performed by: FAMILY MEDICINE

## 2021-10-05 PROCEDURE — 90471 IMMUNIZATION ADMIN: CPT | Performed by: FAMILY MEDICINE

## 2021-10-05 PROCEDURE — 3077F SYST BP >= 140 MM HG: CPT | Performed by: FAMILY MEDICINE

## 2021-10-05 PROCEDURE — 99396 PREV VISIT EST AGE 40-64: CPT | Performed by: FAMILY MEDICINE

## 2021-10-05 PROCEDURE — 3080F DIAST BP >= 90 MM HG: CPT | Performed by: FAMILY MEDICINE

## 2021-10-05 PROCEDURE — 90686 IIV4 VACC NO PRSV 0.5 ML IM: CPT | Performed by: FAMILY MEDICINE

## 2021-10-05 NOTE — PROGRESS NOTES
PATIENT IDENTIFICATION  Name: Nate Martinez  MRN: [id number]    Diagnoses:   Preventative health care  (primary encounter diagnosis)  Need for influenza vaccination  Colon cancer screening  Type 2 diabetes mellitus without complication, without long-term Position: Sitting, Cuff Size: adult)   Pulse 65   Resp 15   Ht 5' 2\" (1.575 m)   Wt 138 lb (62.6 kg)   SpO2 97%   BMI 25.24 kg/m²   General Appearance: in no apparent distress and well developed and well nourished  HEENT: Normocephalic, atraumatic, normal

## 2021-10-09 ENCOUNTER — OFFICE VISIT (OUTPATIENT)
Dept: FAMILY MEDICINE CLINIC | Facility: CLINIC | Age: 61
End: 2021-10-09
Payer: COMMERCIAL

## 2021-10-09 VITALS
SYSTOLIC BLOOD PRESSURE: 130 MMHG | HEART RATE: 76 BPM | DIASTOLIC BLOOD PRESSURE: 78 MMHG | OXYGEN SATURATION: 97 % | RESPIRATION RATE: 20 BRPM | TEMPERATURE: 98 F

## 2021-10-09 DIAGNOSIS — R51.9 FRONTAL HEADACHE: Primary | ICD-10-CM

## 2021-10-09 DIAGNOSIS — Z20.822 EXPOSURE TO COVID-19 VIRUS: ICD-10-CM

## 2021-10-09 PROCEDURE — 3075F SYST BP GE 130 - 139MM HG: CPT

## 2021-10-09 PROCEDURE — 3078F DIAST BP <80 MM HG: CPT

## 2021-10-09 PROCEDURE — 99213 OFFICE O/P EST LOW 20 MIN: CPT

## 2021-10-09 NOTE — PROGRESS NOTES
CHIEF COMPLAINT:   Patient presents with:  Covid-19 Test: wife tested positive yesterday  Has mild frontal HA today a 5/10 level pain, Tylenol helped a little    HPI:   Leeroy Gonzalez is a 64year old male who presents with symptoms as described below for normal appetite, drinking fluids ok  SKIN: Denies rash or other lesions  HEENT: See HPI  LUNGS: See HPI  CARDIOVASCULAR: denies palpitations or chest pain; see HPI  GI: see HPI  NEURO: Denies dizziness; just 5/10 frontal HA. NO neck stiffness.  see HPI    E testing. Advised to follow up with PCP, notify them of pending test first, for reevaluation for persistent symptoms.       Discussed s/s of worsening infection/condition with Other daughter and importance of prompt medical re-evaluation including when t

## 2021-10-12 ENCOUNTER — TELEMEDICINE (OUTPATIENT)
Dept: INTERNAL MEDICINE CLINIC | Facility: CLINIC | Age: 61
End: 2021-10-12
Payer: COMMERCIAL

## 2021-10-12 DIAGNOSIS — U07.1 COVID-19: Primary | ICD-10-CM

## 2021-10-12 PROCEDURE — 99213 OFFICE O/P EST LOW 20 MIN: CPT | Performed by: FAMILY MEDICINE

## 2021-12-14 ENCOUNTER — TELEPHONE (OUTPATIENT)
Dept: INTERNAL MEDICINE CLINIC | Facility: CLINIC | Age: 61
End: 2021-12-14

## 2021-12-14 NOTE — TELEPHONE ENCOUNTER
Contacted and verified with patient that he was not hospitalized nor received antibody treatments.  Pt understood and will schedule with local pharmacy for booster.    -Randi Mar

## 2021-12-14 NOTE — TELEPHONE ENCOUNTER
Pt called because he had Covid Oct 9 - 16 and wants to know when it will be safe to have his booster.

## 2021-12-14 NOTE — TELEPHONE ENCOUNTER
Seems as though patient was never hospitalized for covid, nor did he receive any antibody treatments. Please just verify that this is true and if so, it is safe to get booster now as long as he is not feeling ill in any way.

## 2022-01-26 RX ORDER — SITAGLIPTIN AND METFORMIN HYDROCHLORIDE 1000; 50 MG/1; MG/1
1 TABLET, FILM COATED ORAL DAILY
Qty: 90 TABLET | Refills: 0 | Status: SHIPPED | OUTPATIENT
Start: 2022-01-26 | End: 2022-03-14

## 2022-03-14 ENCOUNTER — OFFICE VISIT (OUTPATIENT)
Dept: INTERNAL MEDICINE CLINIC | Facility: CLINIC | Age: 62
End: 2022-03-14
Payer: COMMERCIAL

## 2022-03-14 VITALS
HEART RATE: 75 BPM | HEIGHT: 62 IN | BODY MASS INDEX: 26.02 KG/M2 | WEIGHT: 141.38 LBS | SYSTOLIC BLOOD PRESSURE: 130 MMHG | DIASTOLIC BLOOD PRESSURE: 80 MMHG | OXYGEN SATURATION: 98 %

## 2022-03-14 DIAGNOSIS — Z12.11 SCREENING FOR COLON CANCER: ICD-10-CM

## 2022-03-14 DIAGNOSIS — I10 ESSENTIAL HYPERTENSION: ICD-10-CM

## 2022-03-14 DIAGNOSIS — E11.9 TYPE 2 DIABETES MELLITUS WITHOUT COMPLICATION, WITHOUT LONG-TERM CURRENT USE OF INSULIN (HCC): Primary | ICD-10-CM

## 2022-03-14 DIAGNOSIS — E78.1 HYPERTRIGLYCERIDEMIA: ICD-10-CM

## 2022-03-14 LAB
EST. AVERAGE GLUCOSE BLD GHB EST-MCNC: 137 MG/DL (ref 68–126)
HBA1C MFR BLD: 6.4 % (ref ?–5.7)

## 2022-03-14 PROCEDURE — 99214 OFFICE O/P EST MOD 30 MIN: CPT | Performed by: FAMILY MEDICINE

## 2022-03-14 PROCEDURE — 3075F SYST BP GE 130 - 139MM HG: CPT | Performed by: FAMILY MEDICINE

## 2022-03-14 PROCEDURE — 3008F BODY MASS INDEX DOCD: CPT | Performed by: FAMILY MEDICINE

## 2022-03-14 PROCEDURE — 83036 HEMOGLOBIN GLYCOSYLATED A1C: CPT | Performed by: FAMILY MEDICINE

## 2022-03-14 PROCEDURE — 3079F DIAST BP 80-89 MM HG: CPT | Performed by: FAMILY MEDICINE

## 2022-03-14 PROCEDURE — 3044F HG A1C LEVEL LT 7.0%: CPT | Performed by: FAMILY MEDICINE

## 2022-03-14 RX ORDER — LOSARTAN POTASSIUM 100 MG/1
100 TABLET ORAL DAILY
Qty: 90 TABLET | Refills: 1 | Status: SHIPPED | OUTPATIENT
Start: 2022-03-14

## 2022-03-14 RX ORDER — SITAGLIPTIN AND METFORMIN HYDROCHLORIDE 50; 1000 MG/1; MG/1
1 TABLET, FILM COATED ORAL DAILY
Qty: 90 TABLET | Refills: 1 | Status: SHIPPED | OUTPATIENT
Start: 2022-03-14

## 2022-03-14 RX ORDER — ATORVASTATIN CALCIUM 10 MG/1
10 TABLET, FILM COATED ORAL NIGHTLY
Qty: 90 TABLET | Refills: 3 | Status: SHIPPED | OUTPATIENT
Start: 2022-03-14

## 2023-08-04 ENCOUNTER — OFFICE VISIT (OUTPATIENT)
Dept: INTERNAL MEDICINE CLINIC | Facility: CLINIC | Age: 63
End: 2023-08-04
Payer: COMMERCIAL

## 2023-08-04 VITALS
HEIGHT: 62 IN | DIASTOLIC BLOOD PRESSURE: 98 MMHG | BODY MASS INDEX: 24.84 KG/M2 | HEART RATE: 86 BPM | SYSTOLIC BLOOD PRESSURE: 150 MMHG | RESPIRATION RATE: 16 BRPM | WEIGHT: 135 LBS | OXYGEN SATURATION: 98 %

## 2023-08-04 DIAGNOSIS — E11.9 TYPE 2 DIABETES MELLITUS WITHOUT COMPLICATION, WITHOUT LONG-TERM CURRENT USE OF INSULIN (HCC): Primary | ICD-10-CM

## 2023-08-04 DIAGNOSIS — Z00.00 HEALTH MAINTENANCE EXAMINATION: ICD-10-CM

## 2023-08-04 DIAGNOSIS — I10 ESSENTIAL HYPERTENSION: ICD-10-CM

## 2023-08-04 DIAGNOSIS — M1A.0620 IDIOPATHIC CHRONIC GOUT OF LEFT KNEE WITHOUT TOPHUS: ICD-10-CM

## 2023-08-04 DIAGNOSIS — E78.49 OTHER HYPERLIPIDEMIA: ICD-10-CM

## 2023-08-04 LAB
ALBUMIN SERPL-MCNC: 3.7 G/DL (ref 3.4–5)
ALBUMIN/GLOB SERPL: 1.1 {RATIO} (ref 1–2)
ALP LIVER SERPL-CCNC: 66 U/L
ALT SERPL-CCNC: 36 U/L
ANION GAP SERPL CALC-SCNC: 7 MMOL/L (ref 0–18)
AST SERPL-CCNC: 17 U/L (ref 15–37)
BASOPHILS # BLD AUTO: 0.02 X10(3) UL (ref 0–0.2)
BASOPHILS NFR BLD AUTO: 0.3 %
BILIRUB SERPL-MCNC: 1 MG/DL (ref 0.1–2)
BUN BLD-MCNC: 17 MG/DL (ref 7–18)
BUN/CREAT SERPL: 19.5 (ref 10–20)
CALCIUM BLD-MCNC: 9 MG/DL (ref 8.5–10.1)
CHLORIDE SERPL-SCNC: 109 MMOL/L (ref 98–112)
CHOLEST SERPL-MCNC: 179 MG/DL (ref ?–200)
CO2 SERPL-SCNC: 25 MMOL/L (ref 21–32)
COMPLEXED PSA SERPL-MCNC: 1.33 NG/ML (ref ?–4)
CREAT BLD-MCNC: 0.87 MG/DL
CREAT UR-SCNC: 109 MG/DL
DEPRECATED RDW RBC AUTO: 43.5 FL (ref 35.1–46.3)
EGFRCR SERPLBLD CKD-EPI 2021: 97 ML/MIN/1.73M2 (ref 60–?)
EOSINOPHIL # BLD AUTO: 0.11 X10(3) UL (ref 0–0.7)
EOSINOPHIL NFR BLD AUTO: 1.7 %
ERYTHROCYTE [DISTWIDTH] IN BLOOD BY AUTOMATED COUNT: 13.1 % (ref 11–15)
EST. AVERAGE GLUCOSE BLD GHB EST-MCNC: 192 MG/DL (ref 68–126)
FASTING PATIENT LIPID ANSWER: YES
FASTING STATUS PATIENT QL REPORTED: YES
GLOBULIN PLAS-MCNC: 3.5 G/DL (ref 2.8–4.4)
GLUCOSE BLD-MCNC: 170 MG/DL (ref 70–99)
HBA1C MFR BLD: 8.3 % (ref ?–5.7)
HBV SURFACE AB SER QL: NONREACTIVE
HBV SURFACE AB SERPL IA-ACNC: <3.1 MIU/ML
HCT VFR BLD AUTO: 46.7 %
HCV AB SERPL QL IA: NONREACTIVE
HDLC SERPL-MCNC: 43 MG/DL (ref 40–59)
HGB BLD-MCNC: 16.3 G/DL
IMM GRANULOCYTES # BLD AUTO: 0.01 X10(3) UL (ref 0–1)
IMM GRANULOCYTES NFR BLD: 0.2 %
LDLC SERPL CALC-MCNC: 106 MG/DL (ref ?–100)
LYMPHOCYTES # BLD AUTO: 2.59 X10(3) UL (ref 1–4)
LYMPHOCYTES NFR BLD AUTO: 39.4 %
MCH RBC QN AUTO: 32 PG (ref 26–34)
MCHC RBC AUTO-ENTMCNC: 34.9 G/DL (ref 31–37)
MCV RBC AUTO: 91.6 FL
MICROALBUMIN UR-MCNC: 1.03 MG/DL
MICROALBUMIN/CREAT 24H UR-RTO: 9.4 UG/MG (ref ?–30)
MONOCYTES # BLD AUTO: 0.5 X10(3) UL (ref 0.1–1)
MONOCYTES NFR BLD AUTO: 7.6 %
NEUTROPHILS # BLD AUTO: 3.35 X10 (3) UL (ref 1.5–7.7)
NEUTROPHILS # BLD AUTO: 3.35 X10(3) UL (ref 1.5–7.7)
NEUTROPHILS NFR BLD AUTO: 50.8 %
NONHDLC SERPL-MCNC: 136 MG/DL (ref ?–130)
OSMOLALITY SERPL CALC.SUM OF ELEC: 298 MOSM/KG (ref 275–295)
PLATELET # BLD AUTO: 187 10(3)UL (ref 150–450)
POTASSIUM SERPL-SCNC: 3.7 MMOL/L (ref 3.5–5.1)
PROT SERPL-MCNC: 7.2 G/DL (ref 6.4–8.2)
RBC # BLD AUTO: 5.1 X10(6)UL
SODIUM SERPL-SCNC: 141 MMOL/L (ref 136–145)
TRIGL SERPL-MCNC: 173 MG/DL (ref 30–149)
TSI SER-ACNC: 1.06 MIU/ML (ref 0.36–3.74)
URATE SERPL-MCNC: 7.3 MG/DL
VIT B12 SERPL-MCNC: 291 PG/ML (ref 193–986)
VLDLC SERPL CALC-MCNC: 29 MG/DL (ref 0–30)
WBC # BLD AUTO: 6.6 X10(3) UL (ref 4–11)

## 2023-08-04 PROCEDURE — 86706 HEP B SURFACE ANTIBODY: CPT | Performed by: FAMILY MEDICINE

## 2023-08-04 PROCEDURE — 3080F DIAST BP >= 90 MM HG: CPT | Performed by: FAMILY MEDICINE

## 2023-08-04 PROCEDURE — 83036 HEMOGLOBIN GLYCOSYLATED A1C: CPT | Performed by: FAMILY MEDICINE

## 2023-08-04 PROCEDURE — 84550 ASSAY OF BLOOD/URIC ACID: CPT | Performed by: FAMILY MEDICINE

## 2023-08-04 PROCEDURE — 84153 ASSAY OF PSA TOTAL: CPT | Performed by: FAMILY MEDICINE

## 2023-08-04 PROCEDURE — 82043 UR ALBUMIN QUANTITATIVE: CPT | Performed by: FAMILY MEDICINE

## 2023-08-04 PROCEDURE — 82607 VITAMIN B-12: CPT | Performed by: FAMILY MEDICINE

## 2023-08-04 PROCEDURE — 86803 HEPATITIS C AB TEST: CPT | Performed by: FAMILY MEDICINE

## 2023-08-04 PROCEDURE — 99214 OFFICE O/P EST MOD 30 MIN: CPT | Performed by: FAMILY MEDICINE

## 2023-08-04 PROCEDURE — 3077F SYST BP >= 140 MM HG: CPT | Performed by: FAMILY MEDICINE

## 2023-08-04 PROCEDURE — 82570 ASSAY OF URINE CREATININE: CPT | Performed by: FAMILY MEDICINE

## 2023-08-04 PROCEDURE — 90677 PCV20 VACCINE IM: CPT | Performed by: FAMILY MEDICINE

## 2023-08-04 PROCEDURE — 80061 LIPID PANEL: CPT | Performed by: FAMILY MEDICINE

## 2023-08-04 PROCEDURE — 3008F BODY MASS INDEX DOCD: CPT | Performed by: FAMILY MEDICINE

## 2023-08-04 PROCEDURE — 87389 HIV-1 AG W/HIV-1&-2 AB AG IA: CPT | Performed by: FAMILY MEDICINE

## 2023-08-04 PROCEDURE — 90471 IMMUNIZATION ADMIN: CPT | Performed by: FAMILY MEDICINE

## 2023-08-04 PROCEDURE — 80050 GENERAL HEALTH PANEL: CPT | Performed by: FAMILY MEDICINE

## 2023-08-04 RX ORDER — SITAGLIPTIN AND METFORMIN HYDROCHLORIDE 1000; 50 MG/1; MG/1
1 TABLET, FILM COATED ORAL DAILY
Qty: 90 TABLET | Refills: 0 | Status: SHIPPED | OUTPATIENT
Start: 2023-08-04

## 2023-08-04 RX ORDER — LOSARTAN POTASSIUM 100 MG/1
100 TABLET ORAL DAILY
Qty: 90 TABLET | Refills: 1 | Status: SHIPPED | OUTPATIENT
Start: 2023-08-04

## 2023-08-04 RX ORDER — ATORVASTATIN CALCIUM 10 MG/1
10 TABLET, FILM COATED ORAL NIGHTLY
Qty: 90 TABLET | Refills: 3 | Status: SHIPPED | OUTPATIENT
Start: 2023-08-04

## 2023-08-04 NOTE — ASSESSMENT & PLAN NOTE
Pressures are elevated in the office today  He has been off of his losartan for the last 2 weeks  Restart losartan 100 mg daily. Follow-up with me in 2 weeks to reassess blood pressures.

## 2023-08-04 NOTE — ASSESSMENT & PLAN NOTE
Patient with a history of gout. He reports that it is intermittently in his knees. He is in no pain at this time. I can check his uric acid for now.

## 2023-08-04 NOTE — PATIENT INSTRUCTIONS
PATIENT INSTRUCTIONS    Thank you for seeing me today, it was a pleasure taking care of you. Please check out at the  and schedule a follow up appointment.   Return in about 2 weeks (around 8/18/2023) for physical .  Continue all your medications   I will go over all your blood tests when I see you again in a few weeks   Eye doctor - please see every year for diabetes    Best,  Dr. Coronel Him

## 2023-08-04 NOTE — ASSESSMENT & PLAN NOTE
Patient with a history of type 2 diabetes. He is typically on Sitagliptin-metformin  mg daily. He has been off of his medication as he ran out, advise restarting  I will check labs including hemoglobin A1c, CMP, lipid panel, urine microalbumin, [place] B surface antibody, B12.  Goal A1c less than 7. Referral to ophthalmology provided for eye examination. Follow-up with me in 2 weeks to discuss labs.

## 2023-08-04 NOTE — ASSESSMENT & PLAN NOTE
Patient with a history of hyperlipidemia. He has been off his atorvastatin at this time  Restart atorvastatin 10 mg nightly  Check CMP and lipid panel today.

## 2023-08-07 ENCOUNTER — TELEPHONE (OUTPATIENT)
Dept: INTERNAL MEDICINE CLINIC | Facility: CLINIC | Age: 63
End: 2023-08-07

## 2023-08-07 NOTE — TELEPHONE ENCOUNTER
Pt has unpredictable work schedule. Will make 2 wk follow up appt for physical as soon as his schedule is released.        Marisa Velásquez

## 2023-08-07 NOTE — TELEPHONE ENCOUNTER
Please call patient and help him schedule a physical with me so that I ca recheck his blood pressure, go over his test results and discuss medications with him.  Thanks, Jona Dakins

## 2023-08-18 ENCOUNTER — OFFICE VISIT (OUTPATIENT)
Dept: INTERNAL MEDICINE CLINIC | Facility: CLINIC | Age: 63
End: 2023-08-18
Payer: COMMERCIAL

## 2023-08-18 VITALS
WEIGHT: 136 LBS | OXYGEN SATURATION: 98 % | DIASTOLIC BLOOD PRESSURE: 84 MMHG | HEIGHT: 62 IN | BODY MASS INDEX: 25.03 KG/M2 | HEART RATE: 76 BPM | SYSTOLIC BLOOD PRESSURE: 136 MMHG

## 2023-08-18 DIAGNOSIS — E78.49 OTHER HYPERLIPIDEMIA: ICD-10-CM

## 2023-08-18 DIAGNOSIS — E11.9 TYPE 2 DIABETES MELLITUS WITHOUT COMPLICATION, WITHOUT LONG-TERM CURRENT USE OF INSULIN (HCC): ICD-10-CM

## 2023-08-18 DIAGNOSIS — Z00.00 HEALTH MAINTENANCE EXAMINATION: Primary | ICD-10-CM

## 2023-08-18 DIAGNOSIS — D22.9 MULTIPLE NEVI: ICD-10-CM

## 2023-08-18 DIAGNOSIS — I10 ESSENTIAL HYPERTENSION: ICD-10-CM

## 2023-08-18 DIAGNOSIS — M1A.0620 IDIOPATHIC CHRONIC GOUT OF LEFT KNEE WITHOUT TOPHUS: ICD-10-CM

## 2023-08-18 RX ORDER — COLCHICINE 0.6 MG/1
0.6 TABLET ORAL DAILY
Qty: 30 TABLET | Refills: 0 | Status: SHIPPED | OUTPATIENT
Start: 2023-08-18

## 2023-08-18 RX ORDER — ALLOPURINOL 100 MG/1
100 TABLET ORAL DAILY
Qty: 90 TABLET | Refills: 1 | Status: SHIPPED | OUTPATIENT
Start: 2023-08-18

## 2023-08-18 NOTE — ASSESSMENT & PLAN NOTE
Exercise and diet advised. Labs reviewed in office today   Tdap today. Hepatitis B vaccine. Shingles vaccine today  Advanced directive information provided. Advised COVID vaccine booster. Colonoscopy referral provided.
Patient desiring skin examination with dermatology. He is in particular interested in getting a brown papule on his left cheek removed. Potentially a seborrheic keratosis. Discussed likely benign nature of the lesion, but patient prefers evaluation with dermatology.
Patient with a history of gout. He reports that it is intermittently in his knees. He is in no pain at this time. Start allopurinol 100 mg daily. Start colchicine 0.6 mg daily for 1 month. Plan to repeat blood work in 3 months during follow up to make sure uric acid level improves.
Patient with a history of type 2 diabetes. Restarted on sitagliptin-metformin  mg daily. Goal A1c less than 7. Referral to ophthalmology provided for eye examination. Follow-up with me in 3 months to reassess diabetes levels.
Pressures are now controlled. Continue losartan 100 mg daily.
Restarted atorvastatin 10 mg nightly  Need to monitor CMP and lipid panel in the future
Adequate: hears normal conversation without difficulty

## 2023-08-18 NOTE — PATIENT INSTRUCTIONS
PATIENT INSTRUCTIONS    Thank you for seeing me today, it was a pleasure taking care of you. Please check out at the  and schedule a follow up appointment. Return in about 3 months (around 11/18/2023) for follow up. The following imaging studies were ordered: None  Please also follow up with the following specialists: GI - colonoscopy, Eye - Dr Lali Fields, dermatology   Please call [phone] to talk to an individual who will help you schedule your colonoscopy. Yuly Snow MD [address] [place] [address] [phone]     Please fill out the advance directive information (power of  documents) and bring a copy to our clinic.   Allopurinol 100 mg daily - take forever for gout  Colchicine 0.6 mg daily - for one month only   Healthy diet and exercise  Continue all other medications       Best,   Dr. Erinn Sykes

## 2023-09-20 ENCOUNTER — OFFICE VISIT (OUTPATIENT)
Dept: DERMATOLOGY CLINIC | Facility: CLINIC | Age: 63
End: 2023-09-20

## 2023-09-20 DIAGNOSIS — D22.9 MULTIPLE BENIGN NEVI: Primary | ICD-10-CM

## 2023-09-20 PROCEDURE — 99203 OFFICE O/P NEW LOW 30 MIN: CPT | Performed by: STUDENT IN AN ORGANIZED HEALTH CARE EDUCATION/TRAINING PROGRAM

## 2023-09-20 NOTE — PROGRESS NOTES
New Patient    Referred by: No referring provider defined for this encounter. CHIEF COMPLAINT: Lesion of concern     HISTORY OF PRESENT ILLNESS: Sara Heredia is a 61year old male here for evaluation of lesion of concern. 1. Growth   Location: left cheek  Duration: 3 years  Signs and symptoms: growth  Current treatment: none  Past treatments: none        Personal Dermatologic History  History of skin cancer: No  History of  atypical moles: No    FAMILY HISTORY:  History of melanoma: No    Past Medical History  Past Medical History:   Diagnosis Date    Essential hypertension 08/28/2020    Other hyperlipidemia 07/27/2017    Type 2 diabetes mellitus without complication, without long-term current use of insulin ([facility] 12/21/2018       REVIEW OF SYSTEMS:  Constitutional: Denies fever, chills, unintentional weight loss. Skin as per HPI    Medications  Current Outpatient Medications   Medication Sig Dispense Refill    allopurinol 100 MG Oral Tab Take 1 tablet (100 mg total) by mouth daily. 90 tablet 1    colchicine 0.6 MG Oral Tab Take 1 tablet (0.6 mg total) by mouth daily. 30 tablet 0    losartan 100 MG Oral Tab Take 1 tablet (100 mg total) by mouth daily. 90 tablet 1    atorvastatin 10 MG Oral Tab Take 1 tablet (10 mg total) by mouth nightly. 90 tablet 3    SITagliptin-metFORMIN HCl (JANUMET)  MG Oral Tab Take 1 tablet by mouth daily. 90 tablet 0       PHYSICAL EXAM:  General: awake, alert, no acute distress  Neuropsych: appropriate mood and affect  Eyes: Sclerae anicteric, without conjunctival injection, eyelids unremarkable  Skin: Skin exam was performed today including the following: face  Pertinent findings include:   - with regular brown macules and brown stuck on papules. ASSESSMENT & PLAN:  Pathophysiology of diagnoses discussed with patient. Therapeutic options reviewed. Risks, benefits, and alternatives discussed with patient. Instructions reviewed at length.     #Multiple benign nevi  - Reassured patient of benign nature of these lesions.   - Return for lesions that are new, growing, changing or symptomatic.   - Recommend daily photoprotection with broad-spectrum sunscreen (SPF 30 daily with reapplication every 2 hours), avoidance of sun during peak hours, and sun protective clothing.   - Dermoscopy was used for physical examination of pigmented lesions during today's office visit. #Inflamed seborrheic keratosis - N/C  - Reassured regarding benign nature of lesion   - Cryotherapy today. - Cryosurgery of non-malignant lesion(s)  - Risks, benefits, alternatives and personnel required for cryosurgery reviewed with patient. Pt verbalizes understanding and wishes to proceed. - Cryosurgery performed with Liquid Nitrogen via cryostat spray gun to ISK. 1 lesion(s) treated. - Patient tolerated well and wound care discussed.      Return to clinic: as needed or sooner if something concerning arises     Sawyer Michael MD

## 2023-10-09 ENCOUNTER — TELEPHONE (OUTPATIENT)
Facility: CLINIC | Age: 63
End: 2023-10-09

## 2023-10-09 ENCOUNTER — OFFICE VISIT (OUTPATIENT)
Facility: CLINIC | Age: 63
End: 2023-10-09

## 2023-10-09 VITALS
WEIGHT: 138 LBS | SYSTOLIC BLOOD PRESSURE: 157 MMHG | HEIGHT: 62 IN | HEART RATE: 67 BPM | DIASTOLIC BLOOD PRESSURE: 83 MMHG | BODY MASS INDEX: 25.4 KG/M2

## 2023-10-09 DIAGNOSIS — Z12.11 COLON CANCER SCREENING: Primary | ICD-10-CM

## 2023-10-09 PROCEDURE — 3077F SYST BP >= 140 MM HG: CPT | Performed by: PHYSICIAN ASSISTANT

## 2023-10-09 PROCEDURE — 3008F BODY MASS INDEX DOCD: CPT | Performed by: PHYSICIAN ASSISTANT

## 2023-10-09 PROCEDURE — S0285 CNSLT BEFORE SCREEN COLONOSC: HCPCS | Performed by: PHYSICIAN ASSISTANT

## 2023-10-09 PROCEDURE — 3079F DIAST BP 80-89 MM HG: CPT | Performed by: PHYSICIAN ASSISTANT

## 2023-10-09 RX ORDER — OMEGA-3S/DHA/EPA/FISH OIL/D3 1150-1000
LIQUID (ML) ORAL DAILY
COMMUNITY

## 2023-10-09 NOTE — TELEPHONE ENCOUNTER
Patient was seen in office and needs a Colonoscopy procedure per order below. . Patient is calling back to schedule. 1. Schedule colonoscopy with Dr. Debbie Cuellar or Dr. Stephanie Corrales with [facility] [Diagnosis: crc screening]     2.  bowel prep from pharmacy (split golytely)     3. Hold sitagliptin- metformin day before and day of procedure.

## 2023-10-09 NOTE — H&P
[facility], [facility] - Gastroenterology                                                                                                               Reason for consult: Patient presents with:  Consult: Colonoscopy      Requesting physician or provider: Donny Donohue MD    :  [id number]    HPI:   Simran Woodson is a 61year old year-old male with history of gout, HTN, HLD, DM who presents for crc screening. he moves his bowels 1-2 times per day and without recent change. he denies straining and/or incomplete evacuation. he denies brbpr and/or melena. he denies acid reflux and/or heartburn. he denies dysphagia, odynophagia and/or globus. he denies abdominal pain. he denies nausea and/or vomiting. he denies recent change in appetite and/or unintentional weight loss. he denies bloating. NSAIDS: PRN  Tobacco: none  Alcohol: none  Marijuana: none  Illicit drugs: none    FH GI malignancy- none  FH celiac dz- none  FH liver dz- none  FH IBD- none    No history of adverse reaction to sedation  No THOMAS  No anticoagulants  No pacemaker/defibrillator  No pain medications and/or sleep aides      Last colonoscopy: over 10 years ago  Last EGD: none    Wt Readings from Last 6 Encounters:  10/09/23 : 138 lb (62.6 kg)  08/18/23 : 136 lb (61.7 kg)  08/04/23 : 135 lb (61.2 kg)  03/14/22 : 141 lb 6.4 oz (64.1 kg)  10/05/21 : 138 lb (62.6 kg)  07/27/21 : 138 lb (62.6 kg)       History, Medications, Allergies, ROS:      Past Medical History:   Diagnosis Date    Essential hypertension 08/28/2020    Other hyperlipidemia 07/27/2017    Type 2 diabetes mellitus without complication, without long-term current use of insulin ([facility] 12/21/2018      History reviewed. No pertinent surgical history.    Family Hx:   Family History   Problem Relation Age of Onset    Diabetes Mother     Hypertension Father     Arthritis Sister     Diabetes Brother     Diabetes Brother     Diabetes Brother     No Known Problems Brother     No Known Problems Brother     No Known Problems Maternal Grandmother     No Known Problems Maternal Grandfather     No Known Problems Paternal Grandmother     No Known Problems Paternal Grandfather     Colon Cancer Neg     Prostate Cancer Neg       Social History:   Social History     Socioeconomic History    Marital status:    Tobacco Use    Smoking status: Never    Smokeless tobacco: Never   Vaping Use    Vaping Use: Never used   Substance and Sexual Activity    Alcohol use: No    Drug use: No    Sexual activity: Yes     Partners: Female     Comment: Wife   Other Topics Concern    Grew up on a farm No    History of tanning Yes    Outdoor occupation No    Reaction to local anesthetic No    Pt has a pacemaker No    Pt has a defibrillator No   Social History Narrative    Relationships: Wife - Juan Ped: Daughter - Nayely Dickinson (adult), granddaughter     Pets: None    School: N/A    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 Enjoys housework outside - yard work. Spiritual:  Spiritism - goes to [employer]. Medications (Active prior to today's visit):  Current Outpatient Medications   Medication Sig Dispense Refill    omega-3 Oral Liquid Take by mouth daily. polyethylene glycol, PEG 3350-KCl-NaBcb-NaCl-NaSulf, 236 g Oral Recon Soln Take 4,000 mL by mouth As Directed. Take 2,000 mL the night before your procedure and 2,000 mL the morning of your procedure. 1 each 0    allopurinol 100 MG Oral Tab Take 1 tablet (100 mg total) by mouth daily. 90 tablet 1    colchicine 0.6 MG Oral Tab Take 1 tablet (0.6 mg total) by mouth daily. 30 tablet 0    losartan 100 MG Oral Tab Take 1 tablet (100 mg total) by mouth daily. 90 tablet 1    atorvastatin 10 MG Oral Tab Take 1 tablet (10 mg total) by mouth nightly. 90 tablet 3    SITagliptin-metFORMIN HCl (JANUMET)  MG Oral Tab Take 1 tablet by mouth daily.  [address] tablet 0       Allergies:  No Known Allergies    ROS:   CONSTITUTIONAL: negative for fevers, chills, sweats and weight loss  EYES Negative for red eyes, yellow eyes, changes in vision  HEENT: Negative for dysphagia and hoarseness  RESPIRATORY: Negative for cough and shortness of breath  CARDIOVASCULAR: Negative for chest pain, palpitations  GASTROINTESTINAL: See HPI  GENITOURINARY: Negative for dysuria and frequency  MUSCULOSKELETAL: Negative for arthralgias and myalgias  NEUROLOGICAL: Negative for dizziness and headaches  BEHAVIOR/PSYCH: Negative for anxiety and poor appetite    PHYSICAL EXAM:   Blood pressure 157/83, pulse 67, height 5' 2\" (1.575 m), weight 138 lb (62.6 kg). GEN: WD/WN, NAD  HEENT: Supple symmetrical, trachea midline  CV: RRR, the extremities are warm and well perfused   LUNGS: No increased work of breathing  ABDOMEN: No scars, normal bowel sounds, soft, non-tender, non-distended no rebound or guarding, no masses, no hepatomegaly  MSK: No redness, no warmth, no swelling of joints  SKIN: No jaundice, no erythema, no rashes  HEMATOLOGIC: No bleeding, no bruising  NEURO: Alert and interactive, normal gait    Labs/Imaging/Procedures:     Patient's pertinent labs and imaging were reviewed and discussed with patient today.      Lab Results   Component Value Date    WBC 6.6 08/04/2023    RBC 5.10 08/04/2023    HGB 16.3 08/04/2023    HCT 46.7 08/04/2023    MCV 91.6 08/04/2023    MCH 32.0 08/04/2023    MCHC 34.9 08/04/2023    RDW 13.1 08/04/2023    .0 08/04/2023        Lab Results   Component Value Date     (H) 08/04/2023    BUN 17 08/04/2023    BUNCREA 19.5 08/04/2023    CREATSERUM 0.87 08/04/2023    ANIONGAP 7 08/04/2023    GFRNAA 94 07/27/2021    GFRAA 108 07/27/2021    CA 9.0 08/04/2023    OSMOCALC 298 (H) 08/04/2023    ALKPHO 66 08/04/2023    AST 17 08/04/2023    ALT 36 08/04/2023    ALKPHOS 63 08/26/2016    BILT 1.0 08/04/2023    TP 7.2 08/04/2023    ALB 3.7 08/04/2023    GLOBULIN 3.5 08/04/2023    AGRATIO 1.5 08/26/2016     08/04/2023    K 3.7 08/04/2023     08/04/2023    CO2 25.0 08/04/2023        No results found. .  ASSESSMENT/PLAN:   Maldonado Mendez is a 61year old year-old male with history of gout, HTN, HLD, DM who presents for crc screening. #crc screening  He is here today as a referral from his PCP for evaluation prior to undergoing colonoscopy for CRC screening. He denies red flags such as recent change in bm, brbpr, and/or melena. He denies a FHx GI malignancy. We discussed the available screening options for CRC such as FIT, cologuard, and CT colonography as well as efficacy of these modalities. They are electing to pursue cln at this time. 1. Schedule colonoscopy with Dr. Kelly Villalobos or Dr. Christine Felton with [facility] [Diagnosis: crc screening]    2.  bowel prep from pharmacy ([facility])    3. Hold sitagliptin- metformin day before and day of procedure. 4. Read all bowel prep instructions carefully. Bowel prep instructions can also be found online at:  www.[facility].org/giprep     5. AVOID seeds, nuts, popcorn, raw fruits and vegetables for 3 days before procedure    6. You MAY need to go for COVID testing 72 hours before procedure. The testing team will call you a few days before your procedure to discuss with you if testing is required. If you are asked to go for COVID testing and do not completed the test, the procedure cannot be performed. 7. If you start any NEW medication after your visit today, please notify us. Certain medications (like iron or weight loss medications) will need to be held before the procedure, or the procedure cannot be performed safely. Orders This Visit:  No orders of the defined types were placed in this encounter.       Meds This Visit:  Requested Prescriptions     Signed Prescriptions Disp Refills    polyethylene glycol, PEG 3350-KCl-NaBcb-NaCl-NaSulf, 236 g Oral Recon Soln 1 each 0     Sig: Take 4,000 mL by mouth As Directed. Take 2,000 mL the night before your procedure and 2,000 mL the morning of your procedure. Imaging & Referrals:  None    ENDOSCOPIC RISK BENEFIT DISCUSSION: I described the procedure in great detail with the patient. I discussed the risks and benefits, including but not limited to: bleeding, perforation, infection, anesthesia complications, and even death. Patient will be NPO after midnight and will have a person physically present at time of pick-up to drive patient home. Patient verbalized understanding and agrees to proceed with procedure as planned. Gisel Myers PA-C   10/9/2023        This note was partially prepared using [email or web address] [facility] Pre[employer] voice recognition dictation software. As a result, errors may occur. When identified, these errors have been corrected.  While every attempt is made to correct errors during dictation, discrepancies may still exist.

## 2023-10-09 NOTE — PATIENT INSTRUCTIONS
1. Schedule colonoscopy with Dr. Leti Arteaga or Dr. Hollie Thomason with [facility] [Diagnosis: crc screening]    2.  bowel prep from pharmacy ([facility])    3. Hold sitagliptin- metformin day before and day of procedure. 4. Read all bowel prep instructions carefully. Bowel prep instructions can also be found online at:  www.health.org/giprep     5. AVOID seeds, nuts, popcorn, raw fruits and vegetables for 3 days before procedure    6. You MAY need to go for COVID testing 72 hours before procedure. The testing team will call you a few days before your procedure to discuss with you if testing is required. If you are asked to go for COVID testing and do not completed the test, the procedure cannot be performed. 7. If you start any NEW medication after your visit today, please notify us. Certain medications (like iron or weight loss medications) will need to be held before the procedure, or the procedure cannot be performed safely.

## 2023-11-01 ENCOUNTER — TELEPHONE (OUTPATIENT)
Facility: CLINIC | Age: 63
End: 2023-11-01

## 2023-11-01 DIAGNOSIS — Z12.11 COLON CANCER SCREENING: Primary | ICD-10-CM

## 2023-11-01 NOTE — TELEPHONE ENCOUNTER
Also see 10/9/2023 TE regarding scheduling CLN - patient returned call. Please call after 4pm - speaks Mohawk. Thank you.

## 2023-11-02 DIAGNOSIS — E11.9 TYPE 2 DIABETES MELLITUS WITHOUT COMPLICATION, WITHOUT LONG-TERM CURRENT USE OF INSULIN (HCC): ICD-10-CM

## 2023-11-02 RX ORDER — SITAGLIPTIN AND METFORMIN HYDROCHLORIDE 1000; 50 MG/1; MG/1
1 TABLET, FILM COATED ORAL DAILY
Qty: 90 TABLET | Refills: 0 | Status: SHIPPED | OUTPATIENT
Start: 2023-11-02

## 2023-11-02 NOTE — TELEPHONE ENCOUNTER
Patient is calling in requesting refill request for Janumet 50mg. Patient states he has 2 tablets left. Please call when refill has been sent in.      [facility] #[id number] [address], [phone], [address] [facility] [address] Phone: [phone] Fax: [phone] Hours: Not open 24 hours

## 2023-11-02 NOTE — TELEPHONE ENCOUNTER
LOV:8-18-23  Last refill:8-4-23        Next Appt:  With Internal Medicine Ewa Reddy MD)  11/20/2023 at 8:40 AM

## 2023-11-20 ENCOUNTER — OFFICE VISIT (OUTPATIENT)
Dept: INTERNAL MEDICINE CLINIC | Facility: CLINIC | Age: 63
End: 2023-11-20
Payer: COMMERCIAL

## 2023-11-20 VITALS
SYSTOLIC BLOOD PRESSURE: 124 MMHG | BODY MASS INDEX: 25.4 KG/M2 | TEMPERATURE: 98 F | HEART RATE: 68 BPM | WEIGHT: 138 LBS | HEIGHT: 62 IN | OXYGEN SATURATION: 98 % | DIASTOLIC BLOOD PRESSURE: 88 MMHG

## 2023-11-20 DIAGNOSIS — M1A.0620 IDIOPATHIC CHRONIC GOUT OF LEFT KNEE WITHOUT TOPHUS: ICD-10-CM

## 2023-11-20 DIAGNOSIS — E11.9 TYPE 2 DIABETES MELLITUS WITHOUT COMPLICATION, WITHOUT LONG-TERM CURRENT USE OF INSULIN (HCC): Primary | ICD-10-CM

## 2023-11-20 DIAGNOSIS — E78.49 OTHER HYPERLIPIDEMIA: ICD-10-CM

## 2023-11-20 DIAGNOSIS — D22.9 MULTIPLE NEVI: ICD-10-CM

## 2023-11-20 DIAGNOSIS — I10 ESSENTIAL HYPERTENSION: ICD-10-CM

## 2023-11-20 DIAGNOSIS — E78.49 OTHER HYPERLIPIDEMIA: Primary | ICD-10-CM

## 2023-11-20 DIAGNOSIS — Z00.00 HEALTH MAINTENANCE EXAMINATION: ICD-10-CM

## 2023-11-20 LAB
ALBUMIN SERPL-MCNC: 4.2 G/DL (ref 3.2–4.8)
ALBUMIN/GLOB SERPL: 1.4 {RATIO} (ref 1–2)
ALP LIVER SERPL-CCNC: 72 U/L
ALT SERPL-CCNC: 25 U/L
ANION GAP SERPL CALC-SCNC: 8 MMOL/L (ref 0–18)
AST SERPL-CCNC: 20 U/L (ref ?–34)
BILIRUB SERPL-MCNC: 1.1 MG/DL (ref 0.2–1.1)
BUN BLD-MCNC: 12 MG/DL (ref 9–23)
BUN/CREAT SERPL: 12.9 (ref 10–20)
CALCIUM BLD-MCNC: 9.6 MG/DL (ref 8.7–10.4)
CARTRIDGE LOT#: ABNORMAL NUMERIC
CHLORIDE SERPL-SCNC: 109 MMOL/L (ref 98–112)
CHOLEST SERPL-MCNC: 157 MG/DL (ref ?–200)
CO2 SERPL-SCNC: 22 MMOL/L (ref 21–32)
CREAT BLD-MCNC: 0.93 MG/DL
EGFRCR SERPLBLD CKD-EPI 2021: 92 ML/MIN/1.73M2 (ref 60–?)
FASTING PATIENT LIPID ANSWER: YES
FASTING STATUS PATIENT QL REPORTED: YES
GLOBULIN PLAS-MCNC: 2.9 G/DL (ref 2.8–4.4)
GLUCOSE BLD-MCNC: 133 MG/DL (ref 70–99)
HDLC SERPL-MCNC: 46 MG/DL (ref 40–59)
HEMOGLOBIN A1C: 7.6 % (ref 4.3–5.6)
LDLC SERPL CALC-MCNC: 81 MG/DL (ref ?–100)
NONHDLC SERPL-MCNC: 111 MG/DL (ref ?–130)
OSMOLALITY SERPL CALC.SUM OF ELEC: 290 MOSM/KG (ref 275–295)
POTASSIUM SERPL-SCNC: 4.4 MMOL/L (ref 3.5–5.1)
PROT SERPL-MCNC: 7.1 G/DL (ref 5.7–8.2)
SODIUM SERPL-SCNC: 139 MMOL/L (ref 136–145)
TRIGL SERPL-MCNC: 174 MG/DL (ref 30–149)
URATE SERPL-MCNC: 4.4 MG/DL
VLDLC SERPL CALC-MCNC: 27 MG/DL (ref 0–30)

## 2023-11-20 PROCEDURE — 80053 COMPREHEN METABOLIC PANEL: CPT | Performed by: FAMILY MEDICINE

## 2023-11-20 PROCEDURE — 80061 LIPID PANEL: CPT | Performed by: FAMILY MEDICINE

## 2023-11-20 PROCEDURE — 84550 ASSAY OF BLOOD/URIC ACID: CPT | Performed by: FAMILY MEDICINE

## 2023-11-20 RX ORDER — ALLOPURINOL 100 MG/1
100 TABLET ORAL DAILY
Qty: 90 TABLET | Refills: 3 | Status: SHIPPED | OUTPATIENT
Start: 2023-11-20

## 2023-11-20 RX ORDER — ATORVASTATIN CALCIUM 20 MG/1
20 TABLET, FILM COATED ORAL NIGHTLY
Qty: 90 TABLET | Refills: 1 | Status: SHIPPED | OUTPATIENT
Start: 2023-11-20

## 2023-11-20 RX ORDER — SITAGLIPTIN AND METFORMIN HYDROCHLORIDE 1000; 50 MG/1; MG/1
1 TABLET, FILM COATED ORAL 2 TIMES DAILY WITH MEALS
Qty: 180 TABLET | Refills: 0 | Status: SHIPPED | OUTPATIENT
Start: 2023-11-20

## 2023-11-20 NOTE — PATIENT INSTRUCTIONS
PATIENT INSTRUCTIONS    Thank you for seeing me today, it was a pleasure taking care of you. Please check out at the  and schedule a follow up appointment. Return in about 3 months (around 2/20/2024) for follow up. Please remember that the kori period for all appointments is 5 minutes. This is to help maximize the amount of time that we can spend together at our visits. Please call [phone] to talk to an individual who will help you schedule your colonoscopy. See eye doctor  Kyler Andrea MD [address] [facility]  [address] [facility] [place]     Increase Janumet to  TWICE a day     [place],  Dr. Sarahi Ronquillo

## 2023-11-20 NOTE — ASSESSMENT & PLAN NOTE
Patient with a history of gout. He reports that it is intermittently in his knees. He is in no pain at this time. Check uric acid. Currently on allopurinol 100 mg daily.

## 2023-11-20 NOTE — ASSESSMENT & PLAN NOTE
Patient with a history of type 2 diabetes. Goal A1c less than 7. Diabetes is still not controlled at this time. Increase sitagliptin-metformin  mg to twice daily. Referral to ophthalmology provided for eye examination. Follow-up with me in 3 months to reassess diabetes levels.

## 2023-11-24 NOTE — TELEPHONE ENCOUNTER
(Kiswahili)     Attempted to contact patient, call was dropped. Called again and went to voice mail.  LMTCB    If patient calls back, can transfer to extension [phone] today only (11/24/2023 )

## 2024-02-02 PROBLEM — H25.13 AGE-RELATED NUCLEAR CATARACT OF BOTH EYES: Status: ACTIVE | Noted: 2024-02-02

## 2024-02-16 ENCOUNTER — OFFICE VISIT (OUTPATIENT)
Dept: INTERNAL MEDICINE CLINIC | Facility: CLINIC | Age: 64
End: 2024-02-16
Payer: COMMERCIAL

## 2024-02-16 VITALS
TEMPERATURE: 98 F | BODY MASS INDEX: 25.4 KG/M2 | OXYGEN SATURATION: 98 % | SYSTOLIC BLOOD PRESSURE: 126 MMHG | WEIGHT: 138 LBS | HEIGHT: 62 IN | DIASTOLIC BLOOD PRESSURE: 78 MMHG | HEART RATE: 74 BPM

## 2024-02-16 DIAGNOSIS — E11.9 TYPE 2 DIABETES MELLITUS WITHOUT COMPLICATION, WITHOUT LONG-TERM CURRENT USE OF INSULIN (HCC): Primary | ICD-10-CM

## 2024-02-16 DIAGNOSIS — M1A.0620 IDIOPATHIC CHRONIC GOUT OF LEFT KNEE WITHOUT TOPHUS: ICD-10-CM

## 2024-02-16 DIAGNOSIS — E78.49 OTHER HYPERLIPIDEMIA: ICD-10-CM

## 2024-02-16 DIAGNOSIS — I10 ESSENTIAL HYPERTENSION: ICD-10-CM

## 2024-02-16 PROCEDURE — 90471 IMMUNIZATION ADMIN: CPT | Performed by: FAMILY MEDICINE

## 2024-02-16 PROCEDURE — 3008F BODY MASS INDEX DOCD: CPT | Performed by: FAMILY MEDICINE

## 2024-02-16 PROCEDURE — 99214 OFFICE O/P EST MOD 30 MIN: CPT | Performed by: FAMILY MEDICINE

## 2024-02-16 PROCEDURE — 3078F DIAST BP <80 MM HG: CPT | Performed by: FAMILY MEDICINE

## 2024-02-16 PROCEDURE — 3074F SYST BP LT 130 MM HG: CPT | Performed by: FAMILY MEDICINE

## 2024-02-16 PROCEDURE — 90746 HEPB VACCINE 3 DOSE ADULT IM: CPT | Performed by: FAMILY MEDICINE

## 2024-02-16 NOTE — ASSESSMENT & PLAN NOTE
Patient with a history of gout.  He reports that it is intermittently in his knees.  He is in no pain at this time.  Currently on allopurinol 100 mg daily.

## 2024-02-16 NOTE — PROGRESS NOTES
FAMILY MEDICINE CLINIC NOTE    HPI  Jordan Jeter is a 63 year old male presenting for     #DM  -Hba1c: 8.3 8/4/2023, 7.6 11/20/2023, will need A1c next week  -Microalbuminuria: 8/2023  -B12: 8/2023  -Pneumonia vaccine: PPSV23 8/2020, PCV20 8/2023  -HepB: Sab neg - needs vaccines, deferred today  -Eye exam: Ophthalmology Dr Tenzin Barboza 1/30/24 - no evidence of diabetic retinopathy. Bilateral senile nuclear cataracts  -Diabetic foot exam: 2/16/24 Bilateral barefoot skin diabetic exam is normal, visualized feet and the appearance is normal. Bilateral monofilament/sensation of both feet is normal. Pulsation pedal pulse exam of both lower legs/feet is normal as well.  -Current medications: Sitagliptin-metformin  (Janumet)  mg twice daily  -Blood sugars:  -AM:      -Noon:   -PM:      -hypoglycemia:     #HTN  -losartan 100 mg daily     #HLD  -atorvastatin 20 mg nightly- increased    #Gout  -knees - reports bilateral knees intermittently   -uric acid 4.4  -allopurinol 100 mg daily      #Cataracts  -ophthalmology - Dr Tenzin Barboza       ---other       #Multiple nevi  -Dermatology Dr Price       used during encounter      ROS  GENERAL: No fever/chills, no recent weight loss  HEENT: No visual changes, no changes in hearing, no sore throats  NECK: No pain, no swelling  RESP: No cough, no SOB  CV: No chest pain, no palpitations  GI: No abd pain, no N/V/D  MSK: No edema  SKIN: No new rashes  NEURO: No numbness, no tingling, no headaches    HEALTH MAINTENANCE  Health Maintenance Topics with due status: Overdue       Topic Date Due    Colorectal Cancer Screening 06/15/2021    COVID-19 Vaccine 09/01/2023    Influenza Vaccine 10/01/2023    Annual Depression Screening 01/01/2024       ALLERGIES  No Known Allergies    MEDICATIONS  Current Outpatient Medications   Medication Sig Dispense Refill    SITagliptin-metFORMIN HCl (JANUMET)  MG Oral Tab Take 1 tablet by mouth 2 (two) times daily with  meals. 180 tablet 0    atorvastatin 20 MG Oral Tab Take 1 tablet (20 mg total) by mouth nightly. 90 tablet 1    allopurinol 100 MG Oral Tab Take 1 tablet (100 mg total) by mouth daily. 90 tablet 3    losartan 100 MG Oral Tab Take 1 tablet (100 mg total) by mouth daily. 90 tablet 1    omega-3 Oral Liquid Take by mouth daily.      polyethylene glycol, PEG 3350-KCl-NaBcb-NaCl-NaSulf, 236 g Oral Recon Soln Take 4,000 mL by mouth As Directed. Take 2,000 mL the night before your procedure and 2,000 mL the morning of your procedure. 1 each 0       ACTIVE PROBLEMS  Patient Active Problem List   Diagnosis    Other hyperlipidemia    Idiopathic chronic gout of left knee without tophus    Type 2 diabetes mellitus without complication, without long-term current use of insulin ([facility])    Essential hypertension    Health maintenance examination    Multiple nevi    Age-related nuclear cataract of both eyes       PAST MEDICAL HISTORY  Past Medical History:   Diagnosis Date    Essential hypertension 08/28/2020    Other hyperlipidemia 07/27/2017    Type 2 diabetes mellitus without complication, without long-term current use of insulin ([facility]) 12/21/2018       PAST SOCIAL HISTORY  Social History     Socioeconomic History    Marital status:      Spouse name: Not on file    Number of children: Not on file    Years of education: Not on file    Highest education level: Not on file   Occupational History    Not on file   Tobacco Use    Smoking status: Never    Smokeless tobacco: Never   Vaping Use    Vaping Use: Never used   Substance and Sexual Activity    Alcohol use: No    Drug use: No    Sexual activity: Yes     Partners: Female     Comment: Wife   Other Topics Concern    Caffeine Concern Not Asked    Exercise Not Asked    Seat Belt Not Asked    Special Diet Not Asked    Stress Concern Not Asked    Weight Concern Not Asked    Grew up on a farm No    History of tanning Yes    Outdoor occupation No    Reaction to local anesthetic No     Pt has a pacemaker No    Pt has a defibrillator No   Social History Narrative    Relationships: Wife - Nydia    Children: Daughter - Jeanne (adult), granddaughter     Pets: None    School: N/A    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 Enjoys housework outside - yard work.     Spiritual:  Anabaptist - goes to [employer].          Social Determinants of Health     Financial Resource Strain: Not on file   Food Insecurity: Not on file   Transportation Needs: Not on file   Physical Activity: Not on file   Stress: Not on file   Social Connections: Not on file   Housing Stability: Not on file       PAST SURGICAL HISTORY  History reviewed. No pertinent surgical history.    PAST FAMILY HISTORY  Family History   Problem Relation Age of Onset    Diabetes Mother     Hypertension Father     Arthritis Sister     Diabetes Brother     Diabetes Brother     Diabetes Brother     No Known Problems Brother     No Known Problems Brother     No Known Problems Maternal Grandmother     No Known Problems Maternal Grandfather     No Known Problems Paternal Grandmother     No Known Problems Paternal Grandfather     Colon Cancer Neg     Prostate Cancer Neg          PHYSICAL EXAM  Vitals:    02/16/24 0945   BP: 126/78   Pulse: 74   Temp: 98.4 °F (36.9 °C)   SpO2: 98%   Weight: 138 lb (62.6 kg)   Height: 5' 2\" (1.575 m)      Body mass index is 25.24 kg/m².    GENERAL: NAD  NECK: Supple, non-tender  RESP: Non-labored respirations, CTAB, no wheezing, no rales, no ronchi  CV: RRR, no murmurs  MSK: No edema. Bilateral barefoot skin diabetic exam is normal, visualized feet and the appearance is normal. Bilateral monofilament/sensation of both feet is normal. Pulsation pedal pulse exam of both lower legs/feet is normal as well.  SKIN: Very skin for bilateral feet  NEURO: Answering questions appropriately    LABS  Lab Results   Component Value Date    WBC 6.6 08/04/2023    HGB 16.3  08/04/2023    HCT 46.7 08/04/2023    .0 08/04/2023    NEPERCENT 50.8 08/04/2023    LYPERCENT 39.4 08/04/2023    MOPERCENT 7.6 08/04/2023    EOPERCENT 1.7 08/04/2023    BAPERCENT 0.3 08/04/2023    NE 3.35 08/04/2023    LYMABS 2.59 08/04/2023    MOABSO 0.50 08/04/2023    EOABSO 0.11 08/04/2023    BAABSO 0.02 08/04/2023       Lab Results   Component Value Date     11/20/2023    K 4.4 11/20/2023     11/20/2023    CO2 22.0 11/20/2023    ANIONGAP 8 11/20/2023    BUN 12 11/20/2023    CREATSERUM 0.93 11/20/2023    BUNCREA 12.9 11/20/2023     (H) 11/20/2023    CA 9.6 11/20/2023    OSMOCALC 290 11/20/2023    GFRNAA 94 07/27/2021    GFRAA 108 07/27/2021    ALT 25 11/20/2023    AST 20 11/20/2023    ALKPHO 72 11/20/2023    BILT 1.1 11/20/2023    TP 7.1 11/20/2023    ALB 4.2 11/20/2023    GLOBULIN 2.9 11/20/2023    ELECTAG 1.4 11/20/2023    FASTING Yes 11/20/2023    FASTING Yes 11/20/2023         Lab Results   Component Value Date    CHOLEST 157 11/20/2023    TRIG 174 (H) 11/20/2023    HDL 46 11/20/2023    LDL 81 11/20/2023    VLDL 27 11/20/2023    NONHDLC 111 11/20/2023    CALCNONHDL 157 (H) 08/26/2016        DIAGNOSTICS      ASSESSMENT/PLAN  Problem List Items Addressed This Visit          HCC Problems    Type 2 diabetes mellitus without complication, without long-term current use of insulin (HCC) - Primary     Patient with a history of type 2 diabetes.  Goal A1c less than 7.  Recently increased sitagliptin-metformin  mg to twice daily.  Check labs next week -hemoglobin A1c, CMP, urine microalbumin.  We will plan to make adjustments to medications if needed afterwards.  Follow-up in 6 months.         Relevant Orders    Hemoglobin A1C    Comp Metabolic Panel (14)    Microalb/Creat Ratio, Random Urine    Hep B, Adult [13532]       Cardiac and Vasculature    Essential hypertension     Pressures are now controlled.   Continue losartan 100 mg daily.         Relevant Orders    Comp Metabolic Panel  (14)    Other hyperlipidemia     Recently increased atorvastatin to 20 mg nightly.  Check CMP and lipid panel          Relevant Orders    Comp Metabolic Panel (14)    Lipid Panel       Musculoskeletal and Injuries    Idiopathic chronic gout of left knee without tophus     Patient with a history of gout.  He reports that it is intermittently in his knees.  He is in no pain at this time.  Currently on allopurinol 100 mg daily.            Return in about 6 months (around 2024) for physical .    No topic due editable text     Sylvester Duff MD  Family Medicine           Pre-chartin minutes  Reviewing/obtaining: 10 minutes  Medical Exam:1 minutes  Counseling/education: 5 minutes  Notes: 5 minutes  Referring/communicatin minutes  Care coordination: 0 minutes    My total time spent caring for the patient on the day of the encounter: 23 minutes

## 2024-02-16 NOTE — PATIENT INSTRUCTIONS
PATIENT INSTRUCTIONS    Thank you for seeing me today, it was a pleasure taking care of you.  Please check out at the  and schedule a follow up appointment.  Return in about 6 months (around 8/16/2024) for physical .  Please remember that the kori period for all appointments is 5 minutes. This is to help maximize the amount of time that we can spend together at our visits.    Get blood drawn next weekend  Continue current medications    [address] [address]    Lab Hours  Monday - Friday 7:30am - 5pm  Saturday, 7:30am - 11:30am    X-ray Hours  None at this location    Best,  Dr. Duff

## 2024-02-16 NOTE — ASSESSMENT & PLAN NOTE
Patient with a history of type 2 diabetes.  Goal A1c less than 7.  Recently increased sitagliptin-metformin  mg to twice daily.  Check labs next week -hemoglobin A1c, CMP, urine microalbumin.  We will plan to make adjustments to medications if needed afterwards.  Follow-up in 6 months.

## 2024-03-11 ENCOUNTER — TELEPHONE (OUTPATIENT)
Dept: INTERNAL MEDICINE CLINIC | Facility: CLINIC | Age: 64
End: 2024-03-11

## 2024-03-11 DIAGNOSIS — M1A.0620 IDIOPATHIC CHRONIC GOUT OF LEFT KNEE WITHOUT TOPHUS: Primary | ICD-10-CM

## 2024-03-11 RX ORDER — INDOMETHACIN 50 MG/1
50 CAPSULE ORAL 2 TIMES DAILY WITH MEALS
Qty: 10 CAPSULE | Refills: 1 | Status: SHIPPED | OUTPATIENT
Start: 2024-03-11

## 2024-03-11 NOTE — TELEPHONE ENCOUNTER
Patient is calling in stating he has been experiencing knee pain since Friday. Patient state he would like to renew his old prescription of  \"Endometafin\"    [facility] #[id number] - [place], [place] - [address] [facility], [phone], [phone]   [address] [facility] [address]   Phone: [phone] Fax: [phone]   Hours: Not open 24 hours

## 2024-03-15 ENCOUNTER — LAB ENCOUNTER (OUTPATIENT)
Dept: LAB | Facility: REFERENCE LAB | Age: 64
End: 2024-03-15
Attending: FAMILY MEDICINE
Payer: COMMERCIAL

## 2024-03-15 DIAGNOSIS — I10 ESSENTIAL HYPERTENSION: ICD-10-CM

## 2024-03-15 DIAGNOSIS — E78.49 OTHER HYPERLIPIDEMIA: ICD-10-CM

## 2024-03-15 DIAGNOSIS — E11.9 TYPE 2 DIABETES MELLITUS WITHOUT COMPLICATION, WITHOUT LONG-TERM CURRENT USE OF INSULIN (HCC): ICD-10-CM

## 2024-03-15 LAB
ALBUMIN SERPL-MCNC: 4.4 G/DL (ref 3.2–4.8)
ALBUMIN/GLOB SERPL: 1.5 {RATIO} (ref 1–2)
ALP LIVER SERPL-CCNC: 77 U/L
ALT SERPL-CCNC: 22 U/L
ANION GAP SERPL CALC-SCNC: 7 MMOL/L (ref 0–18)
AST SERPL-CCNC: 16 U/L (ref ?–34)
BILIRUB SERPL-MCNC: 0.8 MG/DL (ref 0.2–1.1)
BUN BLD-MCNC: 13 MG/DL (ref 9–23)
BUN/CREAT SERPL: 13 (ref 10–20)
CALCIUM BLD-MCNC: 10.1 MG/DL (ref 8.7–10.4)
CHLORIDE SERPL-SCNC: 105 MMOL/L (ref 98–112)
CHOLEST SERPL-MCNC: 157 MG/DL (ref ?–200)
CO2 SERPL-SCNC: 28 MMOL/L (ref 21–32)
CREAT BLD-MCNC: 1 MG/DL
CREAT UR-SCNC: 78.2 MG/DL
EGFRCR SERPLBLD CKD-EPI 2021: 84 ML/MIN/1.73M2 (ref 60–?)
EST. AVERAGE GLUCOSE BLD GHB EST-MCNC: 169 MG/DL (ref 68–126)
FASTING PATIENT LIPID ANSWER: YES
FASTING STATUS PATIENT QL REPORTED: YES
GLOBULIN PLAS-MCNC: 3 G/DL (ref 2.8–4.4)
GLUCOSE BLD-MCNC: 137 MG/DL (ref 70–99)
HBA1C MFR BLD: 7.5 % (ref ?–5.7)
HDLC SERPL-MCNC: 35 MG/DL (ref 40–59)
LDLC SERPL CALC-MCNC: 82 MG/DL (ref ?–100)
MICROALBUMIN UR-MCNC: <0.3 MG/DL
NONHDLC SERPL-MCNC: 122 MG/DL (ref ?–130)
OSMOLALITY SERPL CALC.SUM OF ELEC: 292 MOSM/KG (ref 275–295)
POTASSIUM SERPL-SCNC: 4 MMOL/L (ref 3.5–5.1)
PROT SERPL-MCNC: 7.4 G/DL (ref 5.7–8.2)
SODIUM SERPL-SCNC: 140 MMOL/L (ref 136–145)
TRIGL SERPL-MCNC: 238 MG/DL (ref 30–149)
VLDLC SERPL CALC-MCNC: 38 MG/DL (ref 0–30)

## 2024-03-15 PROCEDURE — 82570 ASSAY OF URINE CREATININE: CPT | Performed by: FAMILY MEDICINE

## 2024-03-15 PROCEDURE — 82043 UR ALBUMIN QUANTITATIVE: CPT | Performed by: FAMILY MEDICINE

## 2024-03-15 PROCEDURE — 80061 LIPID PANEL: CPT | Performed by: FAMILY MEDICINE

## 2024-03-15 PROCEDURE — 80053 COMPREHEN METABOLIC PANEL: CPT | Performed by: FAMILY MEDICINE

## 2024-03-15 PROCEDURE — 83036 HEMOGLOBIN GLYCOSYLATED A1C: CPT | Performed by: FAMILY MEDICINE

## 2024-03-21 ENCOUNTER — TELEPHONE (OUTPATIENT)
Dept: INTERNAL MEDICINE CLINIC | Facility: CLINIC | Age: 64
End: 2024-03-21

## 2024-03-21 NOTE — TELEPHONE ENCOUNTER
Patient called in requesting a call back for blood work results from labs he completed last Thursday.

## 2024-05-02 NOTE — PAT NURSING NOTE
Patient called for PAT, name and  verified.  used for duration of call. Instructed to hold JARDIANCE 4 days prior to procedure. Hold sitagliptin/metformin day before and day of procedure per MD instruction.  Arrival time and transportation policy discussed with patient. Patient given MD office number [phone] to call if any other questions. Verbalized understanding.

## 2024-05-08 ENCOUNTER — HOSPITAL ENCOUNTER (OUTPATIENT)
Facility: HOSPITAL | Age: 64
Setting detail: HOSPITAL OUTPATIENT SURGERY
Discharge: HOME | End: 2024-05-08
Attending: INTERNAL MEDICINE | Admitting: INTERNAL MEDICINE
Payer: COMMERCIAL

## 2024-05-08 ENCOUNTER — ANESTHESIA EVENT (OUTPATIENT)
Dept: ENDOSCOPY | Facility: HOSPITAL | Age: 64
End: 2024-05-08
Payer: COMMERCIAL

## 2024-05-08 ENCOUNTER — ANESTHESIA (OUTPATIENT)
Dept: ENDOSCOPY | Facility: HOSPITAL | Age: 64
End: 2024-05-08
Payer: COMMERCIAL

## 2024-05-08 VITALS
BODY MASS INDEX: 23.9 KG/M2 | DIASTOLIC BLOOD PRESSURE: 60 MMHG | SYSTOLIC BLOOD PRESSURE: 112 MMHG | HEART RATE: 53 BPM | RESPIRATION RATE: 18 BRPM | HEIGHT: 64 IN | WEIGHT: 140 LBS | OXYGEN SATURATION: 100 %

## 2024-05-08 DIAGNOSIS — Z12.11 COLON CANCER SCREENING: ICD-10-CM

## 2024-05-08 LAB — GLUCOSE BLDC GLUCOMTR-MCNC: 131 MG/DL (ref 70–99)

## 2024-05-08 PROCEDURE — 0DBM8ZX EXCISION OF DESCENDING COLON, VIA NATURAL OR ARTIFICIAL OPENING ENDOSCOPIC, DIAGNOSTIC: ICD-10-PCS | Performed by: INTERNAL MEDICINE

## 2024-05-08 PROCEDURE — 82962 GLUCOSE BLOOD TEST: CPT

## 2024-05-08 PROCEDURE — 88305 TISSUE EXAM BY PATHOLOGIST: CPT | Performed by: INTERNAL MEDICINE

## 2024-05-08 PROCEDURE — 0DBH8ZX EXCISION OF CECUM, VIA NATURAL OR ARTIFICIAL OPENING ENDOSCOPIC, DIAGNOSTIC: ICD-10-PCS | Performed by: INTERNAL MEDICINE

## 2024-05-08 RX ORDER — SODIUM CHLORIDE, SODIUM LACTATE, POTASSIUM CHLORIDE, CALCIUM CHLORIDE 600; 310; 30; 20 MG/100ML; MG/100ML; MG/100ML; MG/100ML
INJECTION, SOLUTION INTRAVENOUS CONTINUOUS
Status: DISCONTINUED | OUTPATIENT
Start: 2024-05-08 | End: 2024-05-08

## 2024-05-08 RX ORDER — LIDOCAINE HYDROCHLORIDE 10 MG/ML
INJECTION, SOLUTION EPIDURAL; INFILTRATION; INTRACAUDAL; PERINEURAL AS NEEDED
Status: DISCONTINUED | OUTPATIENT
Start: 2024-05-08 | End: 2024-05-08 | Stop reason: SURG

## 2024-05-08 RX ORDER — NALOXONE HYDROCHLORIDE 0.4 MG/ML
0.08 INJECTION, SOLUTION INTRAMUSCULAR; INTRAVENOUS; SUBCUTANEOUS ONCE AS NEEDED
Status: DISCONTINUED | OUTPATIENT
Start: 2024-05-08 | End: 2024-05-08

## 2024-05-08 RX ADMIN — SODIUM CHLORIDE, SODIUM LACTATE, POTASSIUM CHLORIDE, CALCIUM CHLORIDE: 600; 310; 30; 20 INJECTION, SOLUTION INTRAVENOUS at 08:25:00

## 2024-05-08 RX ADMIN — LIDOCAINE HYDROCHLORIDE 25 MG: 10 INJECTION, SOLUTION EPIDURAL; INFILTRATION; INTRACAUDAL; PERINEURAL at 07:58:00

## 2024-05-08 RX ADMIN — SODIUM CHLORIDE, SODIUM LACTATE, POTASSIUM CHLORIDE, CALCIUM CHLORIDE: 600; 310; 30; 20 INJECTION, SOLUTION INTRAVENOUS at 07:56:00

## 2024-05-08 NOTE — ANESTHESIA PREPROCEDURE EVALUATION
Anesthesia PreOp Note    HPI:     Jordan Jeter is a 64 year old male who presents for preoperative consultation requested by: Deepti Barnett MD    Date of Surgery: 5/8/2024    Procedure(s):  COLONOSCOPY  Indication: Colon cancer screening    Relevant Problems   No relevant active problems       NPO:  Last Liquid Consumption Date: 05/08/24  Last Liquid Consumption Time: 0300  Last Solid Consumption Date: 05/07/24  Last Solid Consumption Time: 1300  Last Liquid Consumption Date: 05/08/24          History Review:  Patient Active Problem List    Diagnosis Date Noted    Age-related nuclear cataract of both eyes 02/02/2024    Multiple nevi 08/18/2023    Health maintenance examination 08/04/2023    Essential hypertension 08/28/2020    Type 2 diabetes mellitus without complication, without long-term current use of insulin (HCC) 12/21/2018    Idiopathic chronic gout of left knee without tophus 11/10/2017    Other hyperlipidemia 07/27/2017       Past Medical History:    Diabetes (HCC)    Essential hypertension    Gout    High blood pressure    High cholesterol    Other hyperlipidemia    Type 2 diabetes mellitus without complication, without long-term current use of insulin (HCC)       History reviewed. No pertinent surgical history.    Medications Prior to Admission   Medication Sig Dispense Refill Last Dose    empagliflozin (JARDIANCE) 10 MG Oral Tab Take 1 tablet (10 mg total) by mouth daily. 90 tablet 1 5/4/2024    atorvastatin 40 MG Oral Tab Take 1 tablet (40 mg total) by mouth nightly. 90 tablet 1     allopurinol 100 MG Oral Tab Take 1 tablet (100 mg total) by mouth daily. 90 tablet 3 5/4/2024    omega-3 Oral Liquid Take by mouth daily.       losartan 100 MG Oral Tab Take 1 tablet (100 mg total) by mouth daily. 90 tablet 1 5/4/2024    indomethacin 50 MG Oral Cap Take 1 capsule (50 mg total) by mouth 2 (two) times daily with meals. 10 capsule 1     SITagliptin-metFORMIN HCl (EDWARDUMET)  MG Oral Tab Take 1 tablet by  mouth 2 (two) times daily with meals. 180 tablet 0     polyethylene glycol, PEG 3350-KCl-NaBcb-NaCl-NaSulf, 236 g Oral Recon Soln Take 4,000 mL by mouth As Directed. Take 2,000 mL the night before your procedure and 2,000 mL the morning of your procedure. 1 each 0      Current Facility-Administered Medications Ordered in Epic   Medication Dose Route Frequency Provider Last Rate Last Admin    lactated ringers infusion   Intravenous Continuous Deepti Barnett MD 20 mL/hr at 05/08/24 0709 New Bag at 05/08/24 0709     No current [facility]-ordered outpatient medications on file.       No Known Allergies    Family History   Problem Relation Age of Onset    Diabetes Mother     Hypertension Father     Arthritis Sister     Diabetes Brother     Diabetes Brother     Diabetes Brother     No Known Problems Brother     No Known Problems Brother     No Known Problems Maternal Grandmother     No Known Problems Maternal Grandfather     No Known Problems Paternal Grandmother     No Known Problems Paternal Grandfather     Colon Cancer Neg     Prostate Cancer Neg      Social History     Socioeconomic History    Marital status:    Tobacco Use    Smoking status: Never    Smokeless tobacco: Never   Vaping Use    Vaping status: Never Used   Substance and Sexual Activity    Alcohol use: No    Drug use: No    Sexual activity: Yes     Partners: Female     Comment: Wife   Other Topics Concern    Grew up on a farm No    History of tanning Yes    Outdoor occupation No    Reaction to local anesthetic No    Pt has a pacemaker No    Pt has a defibrillator No       Available pre-op labs reviewed.     Lab Results   Component Value Date     03/15/2024    K 4.0 03/15/2024     03/15/2024    CO2 28.0 03/15/2024    BUN 13 03/15/2024    CREATSERUM 1.00 03/15/2024     (H) 03/15/2024    PGLU 131 (H) 05/08/2024    CA 10.1 03/15/2024          Vital Signs:  Body mass index is 24.03 kg/m².   height is 1.626 m (5' 4\") and weight is 63.5 kg  (140 lb). His blood pressure is 151/83 and his pulse is 66. His respiration is 17 and oxygen saturation is 98%.   Vitals:    05/02/24 1053 05/08/24 0701   BP:  151/83   Pulse:  66   Resp:  17   SpO2:  98%   Weight: 63.5 kg (140 lb)    Height: 1.626 m (5' 4\")         Anesthesia Evaluation     Patient summary reviewed and Nursing notes reviewed    No history of anesthetic complications   Airway   Mallampati: II  TM distance: >3 FB  Neck ROM: full  Dental - Dentition appears grossly intact     Pulmonary - negative ROS and normal exam   Cardiovascular - normal exam  Exercise tolerance: good  (+) hypertension    Neuro/Psych - negative ROS     GI/Hepatic/Renal - negative ROS   (+) bowel prep    Endo/Other    (+) diabetes mellitus type 2  Abdominal                  Anesthesia Plan:   ASA:  2  Plan:   General  Informed Consent Plan and Risks Discussed With:  Patient and   Consent Comment: Vladimir [id number]   Discussed plan with:  Surgeon      I have informed Jordan Jeter and/or legal guardian or family member of the nature of the anesthetic plan, benefits, risks including possible dental damage if relevant, major complications, and any alternative forms of anesthetic management.   All of the patient's questions were answered to the best of my ability. The patient desires the anesthetic management as planned.  KEVIN SAHU CRNA  5/8/2024 7:14 AM  Present on Admission:  **None**

## 2024-05-08 NOTE — ANESTHESIA POSTPROCEDURE EVALUATION
Patient: Jordan eJter    Procedure Summary       Date: 05/08/24 Room / Location: [facility] ENDOSCOPY 04 / EM ENDOSCOPY    Anesthesia Start: 0756 Anesthesia Stop: 0831    Procedure: COLONOSCOPY Diagnosis:       Colon cancer screening      (Diverticulosis, hemorrhoids, colon polyps)    Surgeons: Deepti Barnett MD Anesthesiologist: Breanne Yousif CRNA    Anesthesia Type: general ASA Status: 2            Anesthesia Type: general    Vitals Value Taken Time   /71 05/08/24 0830   Temp  05/08/24 0831   Pulse 55 05/08/24 0830   Resp 14 05/08/24 0830   SpO2 97 % 05/08/24 0830       [facility] AN Post Evaluation:   Patient Evaluated in PACU  Patient Participation: complete - patient participated  Level of Consciousness: awake  Pain Management: adequate  Airway Patency:patent  Dental exam unchanged from preop  Yes    Nausea/Vomiting: none  Cardiovascular Status: acceptable  Respiratory Status: acceptable  Postoperative Hydration acceptable      BREANNE YOUSIF CRNA  5/8/2024 8:31 AM

## 2024-05-08 NOTE — OPERATIVE REPORT
COLONOSCOPY REPORT    Jordan Jeter     3/13/1960 Age 64 year old   PCP Sylvester Duff MD Endoscopist Deepti Barnett MD     Date of procedure: 24    Procedure: Colonoscopy w/ cold snare and cold biopsy    Pre-operative diagnosis: CRC screening    Post-operative diagnosis: colon polyps, colon diverticulosis, internal hemorrhoids    Medications: MAC    Withdrawal time: 22 minutes    Procedure:  Informed consent was obtained from the patient after the risks of the procedure were discussed, including but not limited to bleeding, perforation, aspiration, infection, or possibility of a missed lesion. After discussions of the risks/benefits and alternatives to this procedure, as well as the planned sedation, the patient was placed in the left lateral decubitus position and begun on continuous blood pressure pulse oximetry and EKG monitoring and this was maintained throughout the procedure. Once an adequate level of sedation was obtained a digital rectal exam was completed. Then the lubricated tip of the [id number] diagnostic video colonoscope was inserted and advanced without difficulty to the cecum using the CO2 insufflation technique. The cecum was identified by localizing the trifold, the appendix and the ileocecal valve. Withdrawal was begun with thorough washing and careful examination of the colonic walls and folds. A routine second examination of the cecum/ascending colon was performed. Photodocumentation was obtained. The bowel prep was good. Views of the colon were good with washing. I then carefully withdrew the instrument from the patient who tolerated the procedure well.     Complications: none.    Findings:   1. 4 polyp(s) noted as follows:      A. There were two sessile cecal polyps measuring 2 mm that were completely removed by cold forceps biopsies and retrieved.      B. 4 mm polyp in the descending colon; sessile morphology; completely removed by cold snare polypectomy and retrieved.       C. 2 mm polyp in the descending colon; sessile morphology; completely removed by cold forceps biopsies and retrieved.    2. Diverticulosis: mild in the right colon.    3. Terminal ileum: the visualized mucosa appeared normal.    4. A retroflexed view of the rectum revealed small internal hemorrhoids.    5. The colonic mucosa throughout the colon was carefully examined and showed normal vascular pattern, without evidence of angioectasias or inflammation.     6. TERRI: normal rectal tone, no masses palpated.     Impression:   4 small polyps, resected and retrieved.   Mild colon diverticulosis.  Small internal hemorrhoids     Recommend:  Await pathology.   The interval for the next colonoscopy will be determined after reviewing pathology (likely 3-5 years). If new signs or symptoms develop, colonoscopy may need to be repeated sooner.   High fiber diet.  Monitor for blood in the stool. If having more than just tinge of blood, call office or go to the ER.    >>>If tissue was obtained and you have not received your pathology results either by phone or letter within 2 weeks, please call our office at [phone].    Specimens: cecal polyps x 2, descending colon polyps x 2    Blood loss: <1 ml

## 2024-05-08 NOTE — H&P
History & Physical Examination    Patient Name: Jordan Jeter  MRN: [id number]  [facility]: [id number]  YOB: 1960    Diagnosis: CRC screening    Medications Prior to Admission   Medication Sig Dispense Refill Last Dose    empagliflozin (JARDIANCE) 10 MG Oral Tab Take 1 tablet (10 mg total) by mouth daily. 90 tablet 1 5/4/2024    atorvastatin 40 MG Oral Tab Take 1 tablet (40 mg total) by mouth nightly. 90 tablet 1     allopurinol 100 MG Oral Tab Take 1 tablet (100 mg total) by mouth daily. 90 tablet 3 5/4/2024    omega-3 Oral Liquid Take by mouth daily.       losartan 100 MG Oral Tab Take 1 tablet (100 mg total) by mouth daily. 90 tablet 1 5/4/2024    indomethacin 50 MG Oral Cap Take 1 capsule (50 mg total) by mouth 2 (two) times daily with meals. 10 capsule 1     SITagliptin-metFORMIN HCl (JANUMET)  MG Oral Tab Take 1 tablet by mouth 2 (two) times daily with meals. 180 tablet 0     polyethylene glycol, PEG 3350-KCl-NaBcb-NaCl-NaSulf, 236 g Oral Recon Soln Take 4,000 mL by mouth As Directed. Take 2,000 mL the night before your procedure and 2,000 mL the morning of your procedure. 1 each 0      Current Facility-Administered Medications   Medication Dose Route Frequency    lactated ringers infusion   Intravenous Continuous       Allergies: No Known Allergies    Past Medical History:    Diabetes (HCC)    Essential hypertension    Gout    High blood pressure    High cholesterol    Other hyperlipidemia    Type 2 diabetes mellitus without complication, without long-term current use of insulin (HCC)     Past Surgical History:   Procedure Laterality Date    Colonoscopy N/A 05/08/2024    Dr. Barnett     Family History   Problem Relation Age of Onset    Diabetes Mother     Hypertension Father     Arthritis Sister     Diabetes Brother     Diabetes Brother     Diabetes Brother     No Known Problems Brother     No Known Problems Brother     No Known Problems Maternal Grandmother     No Known Problems Maternal  Grandfather     No Known Problems Paternal Grandmother     No Known Problems Paternal Grandfather     Colon Cancer Neg     Prostate Cancer Neg      Social History     Tobacco Use    Smoking status: Never    Smokeless tobacco: Never   Substance Use Topics    Alcohol use: No         SYSTEM Check if Review is Normal Check if Physical Exam is Normal If not normal, please explain:   HEENT [X ] [ X]    NECK  [X ] [ X]    HEART [X ] [ X]    LUNGS [X ] [ X]    ABDOMEN [X ] [ X]    EXTREMITIES [X ] [ X]    OTHER        I have discussed the risks and benefits and alternatives of the procedure with the patient/family.  They understand and agree to proceed with plan of care.   I have reviewed the History and Physical done within the last 30 days.  Any changes noted above.    Deepti Barnett MD  [facility] - Gastroenterology  5/8/2024

## 2024-05-08 NOTE — DISCHARGE INSTRUCTIONS
Home Care Instructions for Colonoscopy with Sedation    Diet:  - Resume your regular diet as tolerated unless otherwise instructed.  - Start with light meals to minimize bloating.  - Do not drink alcohol today.    Medication:  - If you have questions about resuming your normal medications, please contact your Primary Care Physician.    Activities:  - Take it easy today. Do not return to work today.  - Do not drive today.  - Do not operate any machinery today (including kitchen equipment).    Colonoscopy:  - You may notice some rectal \"spotting\" (a little blood on the toilet tissue) for a day or two after the exam. This is normal.  - If you experience any rectal bleeding (not spotting), persistent tenderness or sharp severe abdominal pains, oral temperature over 100 degrees Fahrenheit, light-headedness or dizziness, or any other problems, contact your doctor.      **If unable to reach your doctor, please go to the [facility] Emergency Room**    - Your referring physician will receive a full report of your examination.  - If you do not hear from your doctor's office within two weeks of your biopsy, please call them for your results.    You may be able to see your laboratory results in [facility] between 4 and 7 business days.  In some cases, your physician may not have viewed the results before they are released to [facility].  If you have questions regarding your results contact the physician who ordered the test/exam by phone or via [facility] by choosing \"Ask a Medical Question.\"

## 2024-07-11 DIAGNOSIS — I10 ESSENTIAL HYPERTENSION: ICD-10-CM

## 2024-07-11 RX ORDER — LOSARTAN POTASSIUM 100 MG/1
100 TABLET ORAL DAILY
Qty: 90 TABLET | Refills: 1 | Status: SHIPPED | OUTPATIENT
Start: 2024-07-11

## 2024-07-11 NOTE — TELEPHONE ENCOUNTER
Called patient and informed medication has been refilled and sent to pharmacy.   Patient verbalized understanding.  MARIA GUADALUPE CHILDERS

## 2024-07-11 NOTE — TELEPHONE ENCOUNTER
Future Appt. 08/21/2024    Last Visit: 02/16/2024    Medication Requested:  Requested Prescriptions     Pending Prescriptions Disp Refills    losartan 100 MG Oral Tab 90 tablet 1     Sig: Take 1 tablet (100 mg total) by mouth daily.        Last refill:      Disp Refills Start End     losartan 100 MG Oral Tab 90 tablet 1 8/4/2023 --    Sig - Route: Take 1 tablet (100 mg total) by mouth daily. - Oral      Hypertension Medications Protocol [id number]/11/2024 11:38 AM   Protocol Details CMP or BMP in past 12 months    Last BP reading less than 140/90    In person appointment or virtual visit in the past 12 mos or appointment in next 3 mos    EGFRCR or GFRNAA > 50

## 2024-07-11 NOTE — TELEPHONE ENCOUNTER
Patient called and made a appointment for a annual physical on  08/18/2024.  He is also asked if he could please get a refill of his prescription:  Losartan 100 MG Oral Tab    [id number] [place] [address] AT [facility], [phone], [phone]

## 2024-08-21 ENCOUNTER — OFFICE VISIT (OUTPATIENT)
Dept: INTERNAL MEDICINE CLINIC | Facility: CLINIC | Age: 64
End: 2024-08-21
Payer: COMMERCIAL

## 2024-08-21 ENCOUNTER — TELEPHONE (OUTPATIENT)
Dept: GASTROENTEROLOGY | Facility: CLINIC | Age: 64
End: 2024-08-21

## 2024-08-21 VITALS
SYSTOLIC BLOOD PRESSURE: 124 MMHG | TEMPERATURE: 98 F | HEART RATE: 74 BPM | DIASTOLIC BLOOD PRESSURE: 86 MMHG | OXYGEN SATURATION: 98 % | HEIGHT: 64 IN | WEIGHT: 129 LBS | BODY MASS INDEX: 22.02 KG/M2

## 2024-08-21 DIAGNOSIS — H25.13 AGE-RELATED NUCLEAR CATARACT OF BOTH EYES: ICD-10-CM

## 2024-08-21 DIAGNOSIS — E78.49 OTHER HYPERLIPIDEMIA: ICD-10-CM

## 2024-08-21 DIAGNOSIS — I10 ESSENTIAL HYPERTENSION: ICD-10-CM

## 2024-08-21 DIAGNOSIS — M1A.0620 IDIOPATHIC CHRONIC GOUT OF LEFT KNEE WITHOUT TOPHUS: ICD-10-CM

## 2024-08-21 DIAGNOSIS — Z00.00 HEALTH MAINTENANCE EXAMINATION: Primary | ICD-10-CM

## 2024-08-21 DIAGNOSIS — D22.9 MULTIPLE NEVI: ICD-10-CM

## 2024-08-21 DIAGNOSIS — E11.9 TYPE 2 DIABETES MELLITUS WITHOUT COMPLICATION, WITHOUT LONG-TERM CURRENT USE OF INSULIN (HCC): ICD-10-CM

## 2024-08-21 LAB — HEMOGLOBIN A1C: 7.6 % (ref 4.3–5.6)

## 2024-08-21 RX ORDER — ALLOPURINOL 100 MG/1
100 TABLET ORAL DAILY
Qty: 90 TABLET | Refills: 3 | Status: SHIPPED | OUTPATIENT
Start: 2024-08-21

## 2024-08-21 RX ORDER — SITAGLIPTIN AND METFORMIN HYDROCHLORIDE 1000; 50 MG/1; MG/1
1 TABLET, FILM COATED ORAL 2 TIMES DAILY WITH MEALS
Qty: 180 TABLET | Refills: 1 | Status: SHIPPED | OUTPATIENT
Start: 2024-08-21

## 2024-08-21 RX ORDER — INDOMETHACIN 50 MG/1
50 CAPSULE ORAL 2 TIMES DAILY PRN
Qty: 30 CAPSULE | Refills: 1 | Status: SHIPPED | OUTPATIENT
Start: 2024-08-21

## 2024-08-21 NOTE — PROGRESS NOTES
FAMILY MEDICINE CLINIC NOTE    HPI  Jordan Jeter is a 64 year old male presenting for physical    #Health Maintenance  -Diet: Reports eating healthy - eating fruits and vegetables.   -Exercise: Reports exercising - running on treadmill for 40 minutes   -Lung cancer screen: Not indicated  -Colon cancer screen: Dr Deepti Barnett colonoscopy 5/8/24, tubular adenoma. Recommend repeat in 3 years.   -Prostate cancer screen: - 8/2023 within normal limits  -Aortic aneurysm screen: Not indicated  -Statin:  - 3/2014  -ASA: Not indicated  -HIV screen: - 8/2023 negative  -Hep C screen: - 8/2023 negative  -Gonorrhea/chlamydia:  Not indicated  -Syphillis: Not indicated  -TB: Not indicated  -Tobacco/alcohol: Per below  -Depression: PHQ-2 score of 0 (score >/= 3 do PHQ-9)  -Advanced Directive: Indicated     #Immunizations  -Tdap: 8/2023  -Flu shot: Not indicated - not season  -PCV13: Not indicated   -PCV20:  8/2023    -PPSV23:  8/2020    -HPV: Not indicated  -RZV (preferred) or VZL: 11/2023, 8/2023  -RSV: Indicated  -COVID: Indicated          #DM  -Hba1c: 8.3 8/4/2023, 7.6 11/20/2023,3/2024 7.5 ,8/21/24 7.6  -Microalbuminuria: 8/2023  -B12: 8/2023  -Pneumonia vaccine: PPSV23 8/2020, PCV20 8/2023  -HepB: Sab neg - needs vaccines  -Eye exam: Ophthalmology Dr Tenzin Barboza 1/30/24 - no evidence of diabetic retinopathy. Bilateral senile nuclear cataracts  -Diabetic foot exam: 2/16/24 Bilateral barefoot skin diabetic exam is normal, visualized feet and the appearance is normal. Bilateral monofilament/sensation of both feet is normal. Pulsation pedal pulse exam of both lower legs/feet is normal as well.  -Current medications: Sitagliptin-metformin  (Janumet)  mg twice daily - accidentally discontinued, jardiance 10 mg daily  -Blood sugars:  -AM:      -Noon:   -PM:      -hypoglycemia:     #HTN  -losartan 100 mg daily     #HLD  -atorvastatin 40 mg nightly    #Gout  -knees - reports bilateral knees intermittently   -uric acid 4.4  11/2023  -allopurinol 100 mg daily  -indomethacin as needed     #Cataracts  -ophthalmology - Dr Tenzin Barboza      #Multiple nevi  -Dermatology Dr Price       used during encounter    #Patient Care Team  Patient Care Team:  Sylvester Duff MD as PCP - General (Family Medicine)  Tenzin Barboza MD (OPHTHALMOLOGY)  Narendra Price MD (DERMATOLOGY)    ROS  GENERAL: No fever/chills, no recent weight loss   HEENT: No visual changes, no changes in hearing, no sore throats  NECK: No pain, no swelling  RESP: No cough, no SOB  CV: No chest pain, no palpitations  GI: No abd pain, no N/V/D  MSK: No edema, no pain  SKIN: No new rashes  NEURO: No numbness, no tingling, no HA    HEALTH MAINTENANCE CHECKLIST  Health Maintenance Topics with due status: Overdue       Topic Date Due    COVID-19 Vaccine 09/01/2023    Annual Physical 08/18/2024     Health Maintenance Topics with due status: Due Soon       Topic Date Due    Diabetes Care A1C 09/15/2024       ALLERGIES  No Known Allergies    MEDICATIONS  Current Outpatient Medications   Medication Sig Dispense Refill    SITagliptin-metFORMIN HCl (JANUMET)  MG Oral Tab Take 1 tablet by mouth 2 (two) times daily with meals. 180 tablet 1    allopurinol 100 MG Oral Tab Take 1 tablet (100 mg total) by mouth daily. 90 tablet 3    indomethacin 50 MG Oral Cap Take 1 capsule (50 mg total) by mouth 2 (two) times daily as needed. 30 capsule 1    losartan 100 MG Oral Tab Take 1 tablet (100 mg total) by mouth daily. 90 tablet 1    empagliflozin (JARDIANCE) 10 MG Oral Tab Take 1 tablet (10 mg total) by mouth daily. 90 tablet 1    atorvastatin 40 MG Oral Tab Take 1 tablet (40 mg total) by mouth nightly. 90 tablet 1    omega-3 Oral Liquid Take by mouth daily.         ACTIVE PROBLEM  Patient Active Problem List   Diagnosis    Other hyperlipidemia    Idiopathic chronic gout of left knee without tophus    Type 2 diabetes mellitus without complication, without long-term  current use of insulin (HCC)    Essential hypertension    Health maintenance examination    Multiple nevi    Age-related nuclear cataract of both eyes       PAST MEDICAL HISTORY  Past Medical History:    Diabetes (HCC)    Essential hypertension    Gout    High blood pressure    High cholesterol    Other hyperlipidemia    Type 2 diabetes mellitus without complication, without long-term current use of insulin (HCC)       PAST SOCIAL HISTORY  Social History     Socioeconomic History    Marital status:      Spouse name: Not on file    Number of children: Not on file    Years of education: Not on file    Highest education level: Not on file   Occupational History    Not on file   Tobacco Use    Smoking status: Never    Smokeless tobacco: Never   Vaping Use    Vaping status: Never Used   Substance and Sexual Activity    Alcohol use: No    Drug use: No    Sexual activity: Yes     Partners: Female     Comment: Wife   Other Topics Concern    Caffeine Concern Not Asked    Exercise Not Asked    Seat Belt Not Asked    Special Diet Not Asked    Stress Concern Not Asked    Weight Concern Not Asked    Grew up on a farm No    History of tanning Yes    Outdoor occupation No    Reaction to local anesthetic No    Pt has a pacemaker No    Pt has a defibrillator No   Social History Narrative    Relationships: Wife - Nydia    Children: Daughter - Jeanne (adult), granddaughter     Pets: None    School: N/A    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 Enjoys housework outside - yard work.     Spiritual:  Holiness - goes to [employer].          Social Determinants of Health     Financial Resource Strain: Not on file   Food Insecurity: Not on file   Transportation Needs: Not on file   Physical Activity: Not on file   Stress: Not on file   Social Connections: Not on file   Housing Stability: Not on file       PAST SURGICAL HISTORY  Past Surgical History:   Procedure  Laterality Date    Colonoscopy N/A 05/08/2024    Dr. Barnett; diverticulosis, hemorrhoids, colon polyps    Colonoscopy N/A 5/8/2024    Procedure: COLONOSCOPY;  Surgeon: Deepti Barnett MD;  [facility]COPY       PAST FAMILY HISTORY  Family History   Problem Relation Age of Onset    Diabetes Mother     Hypertension Father     Arthritis Sister     Diabetes Brother     Diabetes Brother     Diabetes Brother     No Known Problems Brother     No Known Problems Brother     No Known Problems Maternal Grandmother     No Known Problems Maternal Grandfather     No Known Problems Paternal Grandmother     No Known Problems Paternal Grandfather     Colon Cancer Neg     Prostate Cancer Neg        PHYSICAL EXAM  Vitals:    08/21/24 0818   BP: 124/86   Pulse: 74   Temp: 98.4 °F (36.9 °C)   SpO2: 98%   Weight: 129 lb (58.5 kg)   Height: 5' 4\" (1.626 m)      Body mass index is 22.14 kg/m².    GENERAL: NAD  HEENT: Moist mucous membranes, no tonsillar swelling or erythema, PERRLA bilat, TM translucent and non-bulging  NECK: Supple, non-tender  RESP: CTAB, no wheezing, no rales, no ronchi  CV: RRR, no murmurs  GI: Soft, non-distended, non-tender, no guarding, no rebound, no masses  MSK: No edema. A few scattered brown nevi.   SKIN: Warm and dry, no rashes  NEURO: Answering questions appropriately    LABS  Lab Results   Component Value Date    WBC 6.6 08/04/2023    HGB 16.3 08/04/2023    HCT 46.7 08/04/2023    .0 08/04/2023    NEPERCENT 50.8 08/04/2023    LYPERCENT 39.4 08/04/2023    MOPERCENT 7.6 08/04/2023    EOPERCENT 1.7 08/04/2023    BAPERCENT 0.3 08/04/2023    NE 3.35 08/04/2023    LYMABS 2.59 08/04/2023    MOABSO 0.50 08/04/2023    EOABSO 0.11 08/04/2023    BAABSO 0.02 08/04/2023       Lab Results   Component Value Date     03/15/2024    K 4.0 03/15/2024     03/15/2024    CO2 28.0 03/15/2024    ANIONGAP 7 03/15/2024    BUN 13 03/15/2024    CREATSERUM 1.00 03/15/2024    BUNCREA 13.0 03/15/2024     (H)  03/15/2024    CA 10.1 03/15/2024    OSMOCALC 292 03/15/2024    GFRNAA 94 07/27/2021    GFRAA 108 07/27/2021    ALT 22 03/15/2024    AST 16 03/15/2024    ALKPHO 77 03/15/2024    BILT 0.8 03/15/2024    TP 7.4 03/15/2024    ALB 4.4 03/15/2024    GLOBULIN 3.0 03/15/2024    ELECTAG 1.5 03/15/2024    FASTING Yes 03/15/2024    FASTING Yes 03/15/2024         Lab Results   Component Value Date    CHOLEST 157 03/15/2024    TRIG 238 (H) 03/15/2024    HDL 35 (L) 03/15/2024    LDL 82 03/15/2024    VLDL 38 (H) 03/15/2024    NONHDLC 122 03/15/2024    CALCNONHDL 157 (H) 08/26/2016        DIAGNOSTICS    ASSESSMENT/PLAN  Problem List Items Addressed This Visit          HCC Problems    Type 2 diabetes mellitus without complication, without long-term current use of insulin (HCC)     Patient with a history of type 2 diabetes.  Goal A1c less than 7.  Restart sitagliptin-metformin  mg to twice daily - he accidentally discontinued this.  Continue Jardiance 10 mg daily.  Follow up in 3 months         Relevant Medications    SITagliptin-metFORMIN HCl (JANUMET)  MG Oral Tab    Other Relevant Orders    POC Glycohemoglobin [81287] (Completed)       Cardiac and Vasculature    Essential hypertension     Pressures are now controlled.   Continue losartan 100 mg daily.         Other hyperlipidemia     Continue atorvastatin to 40 mg nightly.         Relevant Medications    SITagliptin-metFORMIN HCl (JANUMET)  MG Oral Tab       Musculoskeletal and Injuries    Idiopathic chronic gout of left knee without tophus     Patient with a history of gout.  He reports that it is intermittently in his knees.  He is in no pain at this time.  Currently on allopurinol 100 mg daily.  Intermittently uses indomethacin as needed         Relevant Medications    allopurinol 100 MG Oral Tab    indomethacin 50 MG Oral Cap       Eye    Age-related nuclear cataract of both eyes     Following with ophthalmology             Skin    Multiple nevi     Advised  sunscreen  Will monitor  Has seen dermatology in past            Health Encounters    Health maintenance examination - Primary     Exercise and diet advised.  Hepatitis B vaccine.   Advised RSV vaccine  Advanced directive information provided.  Advised COVID vaccine booster.         Relevant Orders    Hep B, Adult [51036]       Return in about 3 months (around 11/21/2024) for follow up.    Sylvester Duff MD  Family Medicine

## 2024-08-21 NOTE — PATIENT INSTRUCTIONS
PATIENT INSTRUCTIONS    Thank you for seeing me today, it was a pleasure taking care of you.  Please check out at the  and schedule a follow up appointment.  Return in about 3 months (around 11/21/2024) for follow up.  Please remember that the kori period for all appointments is 5 minutes. This is to help maximize the amount of time that we can spend together at our visits.    The following imaging studies were ordered: None  Please also follow up with the following specialists: Ophthalmology - 2025,   Please fill out the advance directive information (power of  documents) and bring a copy to our clinic.  Restart Janumet  mg twice daily  Continue Jardiance 10 mg  Continue all other medications as prescribed    Best,   Dr. Duff

## 2024-08-21 NOTE — ASSESSMENT & PLAN NOTE
Exercise and diet advised.  Hepatitis B vaccine.   Advised RSV vaccine  Advanced directive information provided.  Advised COVID vaccine booster.

## 2024-08-21 NOTE — ASSESSMENT & PLAN NOTE
Patient with a history of type 2 diabetes.  Goal A1c less than 7.  Restart sitagliptin-metformin  mg to twice daily - he accidentally discontinued this.  Continue Jardiance 10 mg daily.  Follow up in 3 months

## 2024-08-21 NOTE — TELEPHONE ENCOUNTER
Recall colonoscopy in 1 year per Dr Barnett.    Colon done 5/8/2025    Health maintenance updated and message sent to patient outreach to repeat colonoscopy in 1 year

## 2024-08-21 NOTE — ASSESSMENT & PLAN NOTE
Patient with a history of gout.  He reports that it is intermittently in his knees.  He is in no pain at this time.  Currently on allopurinol 100 mg daily.  Intermittently uses indomethacin as needed

## 2025-03-04 ENCOUNTER — TELEPHONE (OUTPATIENT)
Dept: GASTROENTEROLOGY | Facility: CLINIC | Age: 65
End: 2025-03-04

## 2025-03-04 NOTE — TELEPHONE ENCOUNTER
Patient outreach message received:    Recall colonoscopy in 1 year per Dr Barnett.     Colon done 5/8/2025    Recall reminder letter sent out to patient via [employer].

## (undated) DEVICE — Device: Brand: DUAL NARE NASAL CANNULAE FEMALE LUER CON 7FT O2 TUBE

## (undated) DEVICE — LASSO POLYPECTOMY SNARE: Brand: LASSO

## (undated) DEVICE — GIJAW SINGLE-USE BIOPSY FORCEPS WITH NEEDLE: Brand: GIJAW

## (undated) DEVICE — TRAP POLYP W/ 2 SPEC TY CLR MAGNIFYING WIND

## (undated) DEVICE — KIT CLEAN ENDOKIT 1.1OZ GOWNX2

## (undated) DEVICE — 60 ML SYRINGE REGULAR TIP: Brand: MONOJECT

## (undated) DEVICE — MEDI-VAC NON-CONDUCTIVE SUCTION TUBING 6MM X 1.8M (6FT.) L: Brand: CARDINAL HEALTH

## (undated) DEVICE — KIT ENDO ORCAPOD 160/180/190

## (undated) NOTE — LETTER
Patient Name: Jordan Jeter : 3/13/1960  Medical Record #: [id number] Printed: 2024  Page 1 of 2                                          [facility]  [address], [place], [place]  Autorización para operación y procedimiento quirúrgico                                                                                                      Por la presente, autorizo a Deepti Barnett MD, mi médico y al asistente a realizar la operación/procedimiento quirúrgico a continuación, así anna a administrar la anestesia que determine necesaria mi médico Nombre (s) de la operación/procedimiento: COLONOSCOPY en Jordan Jeter     Reconozco que isrrael la operación/procedimiento quirúrgico, las condiciones imprevistas pueden requerir de procedimientos adicionales o diferentes a aquellos mencionados anteriormente.  Por lo tanto, autorizo y solicito además que el cirujano antes mencionado, los asistentes o las personas designadas realicen los procedimientos que, a mullen juicio, jessie necesarios y convenientes.    Mi cirujano/médico walton discutido antes de mi cirugía los posibles beneficios, riesgos y efectos secundarios de babita procedimiento, la probabilidad de alcanzar las metas y los posibles problemas que puedan ocurrir isrrael la recuperación.  Ellos también quezada discutido las alternativas razonables al procedimiento, incluso los riesgos, beneficios y efectos secundarios relacionados con las alternativas y los riesgos relacionados con no realizar babita procedimiento.  Quezada respondido a todas mis preguntas y confirmo que no se ha dado ninguna garantía en cuanto a los resultados que pueda obtener.    En althea de que surja la necesidad isrrael mi operación o isrrael el periodo postoperatorio, también autorizo se aplique rhiannon y/o productos sanguíneos.  Asimismo, entiendo que a pesar de las cuidadosas pruebas y análisis de rhiannon o de los productos sanguíneos que realizan las entidades recolectoras, todavía puedo  estar sujeto a efectos adversos anna resultado de recibir santhosh transfusión de rhiannon y/o productos sanguíneos.  A continuación se mencionan algunos, aunque no todos, los riesgos potenciales que pueden ocurrir: fiebre y reacciones alérgicas, reacciones hemolíticas, trasmisión de enfermedades anna hepatitis, SIDA y citomegalovirus (CMV), así anna sobrecarga de líquidos.   En althea de que desee tener santhosh transfusión autóloga de mi propia rhiannon o santhosh transfusión de un donante dirigido.  Lo discutiré con mi médico.  Check only if Refusing Blood or Blood Products  I understand refusal of blood or blood products as deemed necessary by my physician may have serious consequences to my condition to include possible death. I hereby assume responsibility for my refusal and release the hospital, its personnel, and my physicians from any responsibility for the consequences of my refusal.           o  Refuse       Autorizo el uso de cualquier muestra, órgano, tejido, parte del cuerpo u objeto extraño que pueda ser extraído de mi cuerpo isrrael la operación/procedimiento para fines de diagnóstico, investigación o de enseñanza y mullen desecho posterior por las autoridades del hospital.  También, autorizo la revelación de los resultados de las pruebas de muestras y/o los informes escritos al médico tratante anna personal médico del hospital u otros médicos de referencia o consulta involucrados en mi atención, a discreción del patólogo o de mi médico tratante.    Doy consentimiento para que se fotografíen o graben vídeos de las operaciones o procedimientos a realizarse, incluidas las partes de mi cuerpo que jessie adecuadas para propósitos médicos, científicos o educativos, en el entendido de que, mi identidad no sea revelada por las fotografías o por textos descriptivos que las acompañen.  Si el procedimiento es fotografiado o grabado en vídeo, el cirujano obtendrá la imagen, cinta de vídeo o CD original.  El hospital no se hará  responsable por el almacenamiento, la revelación o el mantenimiento de la imagen, cinta o CD.    Autorizo la presencia de un especialista de producto u observadores en el quirófano, según lo considere necesario mi médico o las personas que éste designe.     Reconozco que en althea de que mi procedimiento resulte en un tiempo prolongado de radiografía/fluoroscopia, puedo desarrollar santhosh reacción en la piel.    Si tengo santhosh orden de No intentar la reanimación (RAHEEL), elyssa estado se suspenderá mientras esté en el quirófano, la carlos de procedimientos y isrrael el periodo de recuperación a menos que yo indique lo contrario explícitamente (o santhosh persona autorizada a mari el consentimiento en mi nombre). El cirujano o mi médico tratante determinarán cuándo termina el periodo de recuperación aplicable a los efectos de restablecer la orden de RAHEEL.  Pacientes que se realizan un procedimiento de esterilización: Entiendo que si el procedimiento tiene éxito, los resultados serán permanentes y que, por lo tanto, me será imposible inseminar, concebir o tener hijos.  Asimismo, entiendo que el procedimiento tiene anna propósito la esterilidad, aunque el resultado no está garantizado.   Admito que mi médico me ha explicado la aplicación de sedación/analgesia, incluidos los riesgos y beneficios y, consiento a la administración de sedación/analgesia conforme sea necesario o conveniente a juicio de mi médico.      CERTIFICO QUE HE LEÍDO Y COMPRENDIDO EL CONSENTIMIENTO ANTERIOR PARA LA OPERACIÓN y/o PROCEDIMIENTO.             ______________________________________  _______________________________  Firma del paciente      Firma de la persona responsible  Signature of patient      Signature of responsible person                        ___________________________________                                   Nombre en imprenta de la persona responsible         Name of responsible person          ___________________________________                  Relación con el paciente         Relationship to patient    _________________________________________  ______________ ________________  Firma del testigo          Fecha / Date Hora / Time  Signature of witness    DECLARACÓN DEL MÉDICO Mediante mi firma al calce, afirmo que antes de la hora del procedimiento, he explicadoal paciente y/o a mullen representante legal, los riesgos y beneficios involucrados en el tratamiento propuesto, así comocualquier alternativa razonable al tratamiento propuesto. También le(s) he explicado los riesgos y beneficios involucradosen el rechazo del tratarniento propuesto y alternativas al tratamiento propuesto, y he respondido a las preguntas del(la) paciente(My signature below affirms that prior to the time of the procedure, I have explained to the patient and/or his/her guardian, therisks and benefits involved in the proposed treatment and any reasonable alternative to the proposed treatment. I have alsoexplained the risks and benefits involved in refusal of the proposed treatment and have answered the patient's questions.)    ________________________________________   _________________________   _____________   (Firma del médico/Signature of Physician)                                    (Fecha/Date)                                             (Hora/Time)      Patient Name: Jordan Jeter     : 3/13/1960                 Printed: 2024      Medical Record #: [id number]                                              Page 2 of 2

## (undated) NOTE — MR AVS SNAPSHOT
[address] at [address] [id number] [phone]               Thank you for choosing us for your health care visit with Amarilis Capps DO.   We are glad to serve you and happy to provide you with this mullen Commonly known as:  PHENERGAN with CODEINE                Where to Get Your Medications      These medications were sent to [address], [address] [place] [address], [phone], [address] [facility] Dietary sodium reduction Reduce dietary sodium intake to <= 100 mmol per day (2.4 g sodium or 6 g sodium chloride)   Aerobic physical activity Regular aerobic physical activity (e.g., brisk walking, light jogging, cycling, swimming, etc.) for a goal of at

## (undated) NOTE — Clinical Note
2/23/2017              Jordan Jeter        [address] [facility] [address]         SEEN/EXAMINED TODAY WITH FLU SYNDROME. RETURN TO WORK WHEN FEVER AND COUGH HAVE RESOLVED.       Sincerely,    Stacie Turner,   [address],

## (undated) NOTE — LETTER
August 1, 2021    Melva Arroyo  [address] [place] [address]      [address],   Aqui estan pauline [place]. Pedro diabetes esta kishore controllado y no tiene proteina en pedro orina.  Pauline laboratorios de prostata, rinones y igado estan normal. Llalexanderos si tiene preguntas.      Marva Yousif MD

## (undated) NOTE — LETTER
3/4/2025    Jordan Jeter        [address] [place] [address]            Dear Jordan Jeter,      Our records indicate that you are due for an appointment for a Colonoscopy with Deepti Barnett MD. Our doctors are booking out about 3-6 months in advance for procedures.     Please call our office to schedule this appointment.  Your medical well-being is important to us.    If your insurance requires a referral, please call your primary care office to request one.      Thank you,      The Physicians and Staff at [facility]

## (undated) NOTE — LETTER
5/23/2025    Jordan Jeter        [address] [place] [address]            Dear Jordan Jeter,      Our records indicate that you are due for an appointment for a Colonoscopy with Deepti Barnett MD. Our doctors are booking out about 3-6 months in advance for procedures.     Please call our office to schedule this appointment.  Your medical well-being is important to us.    If your insurance requires a referral, please call your primary care office to request one.      Thank you,      The Physicians and Staff at [facility]

## (undated) NOTE — LETTER
Patient Name: Justino Rinaldi  YOB: 1960          MRN number:  [id number]  Date:  8/4/2021  Referring Physician:   Cass Macias

## (undated) NOTE — LETTER
[facility]   Administracion de Anestesia  Yo, Jordan Jeter, acepto ser atendido por un anestesiólogo, quien está especialmente capacitado para monitorearme y darme medicamento para hacerme dormir o mantenerme cómodo isrrael mi procedimiento.   Entiendo que mi anestesiólogo no es un empleado o agente de [facility]. Él o warren trabaja para [facility]  Nina el paciente que solicita los servicios de anestesia, estoy de acuerdo con lo siguiente:  Permitir al anestesiólogo (médico de anestesia) que me suministre el medicamento y hacer los procedimientos adicionales que jessie necesarios. Algunos ejemplos son: Al iniciar o utilizar santhosh “IV” para suministrarme medicamentos, fluidos o rhiannon isrrael mi procedimiento, y colocarme santhosh sonda de respiración, me ayudará a respirar cuando esté dormido (intubación). En el althea de que mi corazón deje de funcionar correctamente, entiendo que mi anestesiólogo hará todo lo posible para salvar mi naomie, a menos que los documentos de No resucitar de [facility] lo indiquen de otra manera.  Informar a mi anestesista antes del procedimiento:  Si estoy embarazada.  La última vez que comí o bebí algo.  Todos los medicamentos que lina (incluyendo medicamentos recetados, suplementos herbales y pastillas que puedo comprar sin receta médica (incluyendo drogas en la caicedo [medicamentos ilegales]). No informar a mi anestesiólogo acerca de estos medicamentos puede aumentar mi riesgo de complicaciones con la anestesia.  Si soy alérgico a cualquier cosa o he tenido previamente santhosh reacción adversa a la anestesia.  Entiendo cómo la anestesia me ayudará (beneficios).  Entiendo que con cualquier tipo de anestesia hay riesgos:  Los riesgos más comunes son: náuseas, vómitos, dolor de garganta, dolor muscular, daño a los ojos, la boca o los dientes (por la colocación de la sonda de respiración).  Los riesgos poco comunes incluyen: recordar  lo que sucedió isrrael mi procedimiento, reacciones alérgicas a los medicamentos, lesiones en las vías respiratorias, el corazón, los pulmones, la visión, los nervios o músculos, y en casos sumamente raros, la muerte.  Mii médico me ha explicado otras opciones de atención disponibles para mí (alternativas).  Pacientes embarazadas (“epidural”):    Entiendo que los riesgos de recibir santhosh inyección epidural (medicamento que se aplica en la espalda para ayudar a controlar el dolor isrrael el parto), incluyen picazón, presión arterial baja, dificultad para orinar, dolor de maria elena o disminución en el ritmo del corazón del bebé. Los riesgos muy poco comunes incluyen infección, hemorragia, convulsiones, ritmo cardíaco irregular y lesión del nervio.  Anestesia regional (“columna vertebral”, “epidural” y “bloqueo de los nervios”):  Entiendo que hay complicaciones poco frecuentes perry posibles, e incluyen dolor de maria elena, sangrado, infección, convulsiones, ritmo cardíaco irregular y la lesión del nervio.  .   Puedo cambiar de opinión acerca de recibir servicios de anestesia en cualquier momento antes de que se me administre el medicamento.         Paciente (o representante) Firma/Relación con el paciente  Fecha  Hora  Patient Signature/Signature of Responsible person Date Time           Nombre del intérprete (en mullen althea)  Idioma/Organización  Hora  Name of  Language/Organization Time          Firma del anestesiólogo Fecha  Hora  Signature of anesthesiologist Date Time    He hablado del procedimiento y la información anterior con el paciente (o el representante del paciente) y respondí darling preguntas. El paciente o mullen representante ha aceptado recibir los servicios de anestesia.         Testcollin Bowser  Hora  Witness Date Time  He verificado que la firma es la del paciente o del representante del paciente, y que se firmó antes del procedimiento.    Nombre del paciente: Jordan Jeter Fec. de Nac.: 3/13/1960                  En letra de imprenta: May 1, 2024  Expediente médico No. [id number]                         Página 1 de 2  ----------ANESTHESIA CONSENT----------